# Patient Record
Sex: MALE | NOT HISPANIC OR LATINO | URBAN - METROPOLITAN AREA
[De-identification: names, ages, dates, MRNs, and addresses within clinical notes are randomized per-mention and may not be internally consistent; named-entity substitution may affect disease eponyms.]

---

## 2018-09-16 ENCOUNTER — INPATIENT (INPATIENT)
Facility: HOSPITAL | Age: 53
LOS: 1 days | Discharge: HOME | End: 2018-09-18
Attending: FAMILY MEDICINE | Admitting: FAMILY MEDICINE
Payer: COMMERCIAL

## 2018-09-16 VITALS — RESPIRATION RATE: 18 BRPM | HEIGHT: 71 IN | OXYGEN SATURATION: 98 % | HEART RATE: 110 BPM | WEIGHT: 194.01 LBS

## 2018-09-16 LAB
ALBUMIN SERPL ELPH-MCNC: 3.7 G/DL — SIGNIFICANT CHANGE UP (ref 3.3–5)
ALP SERPL-CCNC: 66 U/L — SIGNIFICANT CHANGE UP (ref 40–120)
ALT FLD-CCNC: 42 U/L — SIGNIFICANT CHANGE UP (ref 12–78)
ANION GAP SERPL CALC-SCNC: 8 MMOL/L — SIGNIFICANT CHANGE UP (ref 5–17)
APPEARANCE UR: CLEAR — SIGNIFICANT CHANGE UP
APTT BLD: 29 SEC — SIGNIFICANT CHANGE UP (ref 27.5–37.4)
AST SERPL-CCNC: 23 U/L — SIGNIFICANT CHANGE UP (ref 15–37)
BACTERIA # UR AUTO: ABNORMAL
BASOPHILS # BLD AUTO: 0.04 K/UL — SIGNIFICANT CHANGE UP (ref 0–0.2)
BASOPHILS NFR BLD AUTO: 0.3 % — SIGNIFICANT CHANGE UP (ref 0–2)
BILIRUB SERPL-MCNC: 0.9 MG/DL — SIGNIFICANT CHANGE UP (ref 0.2–1.2)
BILIRUB UR-MCNC: NEGATIVE — SIGNIFICANT CHANGE UP
BUN SERPL-MCNC: 11 MG/DL — SIGNIFICANT CHANGE UP (ref 7–23)
CALCIUM SERPL-MCNC: 9.2 MG/DL — SIGNIFICANT CHANGE UP (ref 8.5–10.1)
CHLORIDE SERPL-SCNC: 101 MMOL/L — SIGNIFICANT CHANGE UP (ref 96–108)
CO2 SERPL-SCNC: 23 MMOL/L — SIGNIFICANT CHANGE UP (ref 22–31)
COLOR SPEC: YELLOW — SIGNIFICANT CHANGE UP
CREAT SERPL-MCNC: 1.14 MG/DL — SIGNIFICANT CHANGE UP (ref 0.5–1.3)
DIFF PNL FLD: NEGATIVE — SIGNIFICANT CHANGE UP
EOSINOPHIL # BLD AUTO: 0.01 K/UL — SIGNIFICANT CHANGE UP (ref 0–0.5)
EOSINOPHIL NFR BLD AUTO: 0.1 % — SIGNIFICANT CHANGE UP (ref 0–6)
EPI CELLS # UR: SIGNIFICANT CHANGE UP
GLUCOSE SERPL-MCNC: 172 MG/DL — HIGH (ref 70–99)
GLUCOSE UR QL: NEGATIVE MG/DL — SIGNIFICANT CHANGE UP
HCT VFR BLD CALC: 44.5 % — SIGNIFICANT CHANGE UP (ref 39–50)
HGB BLD-MCNC: 14.8 G/DL — SIGNIFICANT CHANGE UP (ref 13–17)
IMM GRANULOCYTES NFR BLD AUTO: 0.7 % — SIGNIFICANT CHANGE UP (ref 0–1.5)
INR BLD: 1.17 RATIO — HIGH (ref 0.88–1.16)
KETONES UR-MCNC: ABNORMAL
LACTATE SERPL-SCNC: 1.3 MMOL/L — SIGNIFICANT CHANGE UP (ref 0.7–2)
LEUKOCYTE ESTERASE UR-ACNC: NEGATIVE — SIGNIFICANT CHANGE UP
LYMPHOCYTES # BLD AUTO: 1.29 K/UL — SIGNIFICANT CHANGE UP (ref 1–3.3)
LYMPHOCYTES # BLD AUTO: 10.1 % — LOW (ref 13–44)
MCHC RBC-ENTMCNC: 27.4 PG — SIGNIFICANT CHANGE UP (ref 27–34)
MCHC RBC-ENTMCNC: 33.3 GM/DL — SIGNIFICANT CHANGE UP (ref 32–36)
MCV RBC AUTO: 82.3 FL — SIGNIFICANT CHANGE UP (ref 80–100)
MONOCYTES # BLD AUTO: 0.83 K/UL — SIGNIFICANT CHANGE UP (ref 0–0.9)
MONOCYTES NFR BLD AUTO: 6.5 % — SIGNIFICANT CHANGE UP (ref 2–14)
NEUTROPHILS # BLD AUTO: 10.56 K/UL — HIGH (ref 1.8–7.4)
NEUTROPHILS NFR BLD AUTO: 82.3 % — HIGH (ref 43–77)
NITRITE UR-MCNC: NEGATIVE — SIGNIFICANT CHANGE UP
NRBC # BLD: 0 /100 WBCS — SIGNIFICANT CHANGE UP (ref 0–0)
PH UR: 6 — SIGNIFICANT CHANGE UP (ref 5–8)
PLATELET # BLD AUTO: 183 K/UL — SIGNIFICANT CHANGE UP (ref 150–400)
POTASSIUM SERPL-MCNC: 3.9 MMOL/L — SIGNIFICANT CHANGE UP (ref 3.5–5.3)
POTASSIUM SERPL-SCNC: 3.9 MMOL/L — SIGNIFICANT CHANGE UP (ref 3.5–5.3)
PROT SERPL-MCNC: 8.9 GM/DL — HIGH (ref 6–8.3)
PROT UR-MCNC: 15 MG/DL
PROTHROM AB SERPL-ACNC: 12.7 SEC — SIGNIFICANT CHANGE UP (ref 9.8–12.7)
RAPID RVP RESULT: SIGNIFICANT CHANGE UP
RBC # BLD: 5.41 M/UL — SIGNIFICANT CHANGE UP (ref 4.2–5.8)
RBC # FLD: 13.4 % — SIGNIFICANT CHANGE UP (ref 10.3–14.5)
RBC CASTS # UR COMP ASSIST: SIGNIFICANT CHANGE UP /HPF (ref 0–4)
SODIUM SERPL-SCNC: 132 MMOL/L — LOW (ref 135–145)
SP GR SPEC: 1.01 — SIGNIFICANT CHANGE UP (ref 1.01–1.02)
TROPONIN I SERPL-MCNC: <0.015 NG/ML — SIGNIFICANT CHANGE UP (ref 0.01–0.04)
UROBILINOGEN FLD QL: NEGATIVE MG/DL — SIGNIFICANT CHANGE UP
WBC # BLD: 12.82 K/UL — HIGH (ref 3.8–10.5)
WBC # FLD AUTO: 12.82 K/UL — HIGH (ref 3.8–10.5)
WBC UR QL: SIGNIFICANT CHANGE UP

## 2018-09-16 PROCEDURE — 71250 CT THORAX DX C-: CPT | Mod: 26

## 2018-09-16 PROCEDURE — 74176 CT ABD & PELVIS W/O CONTRAST: CPT | Mod: 26

## 2018-09-16 PROCEDURE — 71045 X-RAY EXAM CHEST 1 VIEW: CPT | Mod: 26

## 2018-09-16 PROCEDURE — 93010 ELECTROCARDIOGRAM REPORT: CPT

## 2018-09-16 PROCEDURE — 99285 EMERGENCY DEPT VISIT HI MDM: CPT

## 2018-09-16 RX ORDER — IBUPROFEN 200 MG
400 TABLET ORAL EVERY 6 HOURS
Qty: 0 | Refills: 0 | Status: COMPLETED | OUTPATIENT
Start: 2018-09-16 | End: 2018-09-17

## 2018-09-16 RX ORDER — DEXTROSE 50 % IN WATER 50 %
12.5 SYRINGE (ML) INTRAVENOUS ONCE
Qty: 0 | Refills: 0 | Status: DISCONTINUED | OUTPATIENT
Start: 2018-09-16 | End: 2018-09-18

## 2018-09-16 RX ORDER — DEXTROSE 50 % IN WATER 50 %
15 SYRINGE (ML) INTRAVENOUS ONCE
Qty: 0 | Refills: 0 | Status: DISCONTINUED | OUTPATIENT
Start: 2018-09-16 | End: 2018-09-18

## 2018-09-16 RX ORDER — INSULIN LISPRO 100/ML
VIAL (ML) SUBCUTANEOUS
Qty: 0 | Refills: 0 | Status: DISCONTINUED | OUTPATIENT
Start: 2018-09-16 | End: 2018-09-18

## 2018-09-16 RX ORDER — INFLUENZA VIRUS VACCINE 15; 15; 15; 15 UG/.5ML; UG/.5ML; UG/.5ML; UG/.5ML
0.5 SUSPENSION INTRAMUSCULAR ONCE
Qty: 0 | Refills: 0 | Status: COMPLETED | OUTPATIENT
Start: 2018-09-16 | End: 2018-09-16

## 2018-09-16 RX ORDER — AZITHROMYCIN 500 MG/1
500 TABLET, FILM COATED ORAL ONCE
Qty: 0 | Refills: 0 | Status: COMPLETED | OUTPATIENT
Start: 2018-09-16 | End: 2018-09-16

## 2018-09-16 RX ORDER — SODIUM CHLORIDE 9 MG/ML
1000 INJECTION, SOLUTION INTRAVENOUS
Qty: 0 | Refills: 0 | Status: DISCONTINUED | OUTPATIENT
Start: 2018-09-16 | End: 2018-09-18

## 2018-09-16 RX ORDER — SODIUM CHLORIDE 9 MG/ML
1000 INJECTION INTRAMUSCULAR; INTRAVENOUS; SUBCUTANEOUS
Qty: 0 | Refills: 0 | Status: COMPLETED | OUTPATIENT
Start: 2018-09-16 | End: 2018-09-16

## 2018-09-16 RX ORDER — CEFTRIAXONE 500 MG/1
1000 INJECTION, POWDER, FOR SOLUTION INTRAMUSCULAR; INTRAVENOUS ONCE
Qty: 0 | Refills: 0 | Status: COMPLETED | OUTPATIENT
Start: 2018-09-16 | End: 2018-09-16

## 2018-09-16 RX ORDER — IBUPROFEN 200 MG
200 TABLET ORAL ONCE
Qty: 0 | Refills: 0 | Status: COMPLETED | OUTPATIENT
Start: 2018-09-16 | End: 2018-09-16

## 2018-09-16 RX ORDER — DEXTROSE 50 % IN WATER 50 %
25 SYRINGE (ML) INTRAVENOUS ONCE
Qty: 0 | Refills: 0 | Status: DISCONTINUED | OUTPATIENT
Start: 2018-09-16 | End: 2018-09-18

## 2018-09-16 RX ORDER — CEFTRIAXONE 500 MG/1
1000 INJECTION, POWDER, FOR SOLUTION INTRAMUSCULAR; INTRAVENOUS EVERY 24 HOURS
Qty: 0 | Refills: 0 | Status: DISCONTINUED | OUTPATIENT
Start: 2018-09-16 | End: 2018-09-18

## 2018-09-16 RX ORDER — ENOXAPARIN SODIUM 100 MG/ML
40 INJECTION SUBCUTANEOUS EVERY 24 HOURS
Qty: 0 | Refills: 0 | Status: DISCONTINUED | OUTPATIENT
Start: 2018-09-16 | End: 2018-09-18

## 2018-09-16 RX ORDER — GLUCAGON INJECTION, SOLUTION 0.5 MG/.1ML
1 INJECTION, SOLUTION SUBCUTANEOUS ONCE
Qty: 0 | Refills: 0 | Status: DISCONTINUED | OUTPATIENT
Start: 2018-09-16 | End: 2018-09-18

## 2018-09-16 RX ORDER — ACETAMINOPHEN 500 MG
975 TABLET ORAL ONCE
Qty: 0 | Refills: 0 | Status: COMPLETED | OUTPATIENT
Start: 2018-09-16 | End: 2018-09-16

## 2018-09-16 RX ORDER — SODIUM CHLORIDE 9 MG/ML
2500 INJECTION INTRAMUSCULAR; INTRAVENOUS; SUBCUTANEOUS ONCE
Qty: 0 | Refills: 0 | Status: COMPLETED | OUTPATIENT
Start: 2018-09-16 | End: 2018-09-16

## 2018-09-16 RX ORDER — CEFTRIAXONE 500 MG/1
1 INJECTION, POWDER, FOR SOLUTION INTRAMUSCULAR; INTRAVENOUS EVERY 24 HOURS
Qty: 0 | Refills: 0 | Status: DISCONTINUED | OUTPATIENT
Start: 2018-09-16 | End: 2018-09-16

## 2018-09-16 RX ORDER — ACETAMINOPHEN 500 MG
650 TABLET ORAL ONCE
Qty: 0 | Refills: 0 | Status: COMPLETED | OUTPATIENT
Start: 2018-09-16 | End: 2018-09-16

## 2018-09-16 RX ORDER — ACETAMINOPHEN 500 MG
500 TABLET ORAL EVERY 6 HOURS
Qty: 0 | Refills: 0 | Status: DISCONTINUED | OUTPATIENT
Start: 2018-09-16 | End: 2018-09-18

## 2018-09-16 RX ORDER — AZITHROMYCIN 500 MG/1
500 TABLET, FILM COATED ORAL EVERY 24 HOURS
Qty: 0 | Refills: 0 | Status: DISCONTINUED | OUTPATIENT
Start: 2018-09-16 | End: 2018-09-18

## 2018-09-16 RX ADMIN — Medication 500 MILLIGRAM(S): at 16:58

## 2018-09-16 RX ADMIN — AZITHROMYCIN 255 MILLIGRAM(S): 500 TABLET, FILM COATED ORAL at 12:03

## 2018-09-16 RX ADMIN — CEFTRIAXONE 1000 MILLIGRAM(S): 500 INJECTION, POWDER, FOR SOLUTION INTRAMUSCULAR; INTRAVENOUS at 12:03

## 2018-09-16 RX ADMIN — AZITHROMYCIN 500 MILLIGRAM(S): 500 TABLET, FILM COATED ORAL at 13:03

## 2018-09-16 RX ADMIN — Medication 975 MILLIGRAM(S): at 12:03

## 2018-09-16 RX ADMIN — Medication 975 MILLIGRAM(S): at 12:09

## 2018-09-16 RX ADMIN — SODIUM CHLORIDE 500 MILLILITER(S): 9 INJECTION INTRAMUSCULAR; INTRAVENOUS; SUBCUTANEOUS at 19:51

## 2018-09-16 RX ADMIN — Medication 650 MILLIGRAM(S): at 22:47

## 2018-09-16 RX ADMIN — SODIUM CHLORIDE 5000 MILLILITER(S): 9 INJECTION INTRAMUSCULAR; INTRAVENOUS; SUBCUTANEOUS at 11:15

## 2018-09-16 RX ADMIN — SODIUM CHLORIDE 500 MILLILITER(S): 9 INJECTION INTRAMUSCULAR; INTRAVENOUS; SUBCUTANEOUS at 22:11

## 2018-09-16 RX ADMIN — Medication 400 MILLIGRAM(S): at 20:17

## 2018-09-16 RX ADMIN — SODIUM CHLORIDE 2500 MILLILITER(S): 9 INJECTION INTRAMUSCULAR; INTRAVENOUS; SUBCUTANEOUS at 12:15

## 2018-09-16 RX ADMIN — ENOXAPARIN SODIUM 40 MILLIGRAM(S): 100 INJECTION SUBCUTANEOUS at 23:58

## 2018-09-16 NOTE — ED PROVIDER NOTE - OBJECTIVE STATEMENT
Braxton Holguin MD: 53 M w/ Hx of DM on metformin and glipizide, who p/w fever and cough. Constant fever x4 days with Tmax of 104'F. Pt reports left sided flank pain, nonproductive cough and urinary complaints w/ dysuria. Also reports mild SOB and diff catching breath. Pt notes he has been taking Advil and Motrin, which improves the sx but is recurrent after 3-4 hours. Pt went to urgent care today, given flu test and throat swab. Last Advil was 9am this morning. Allergy to Penicillin.

## 2018-09-16 NOTE — ED ADULT TRIAGE NOTE - CHIEF COMPLAINT QUOTE
Pt reports fever over past few days. Reports left flank pain. Pt reports fever over past few days. Reports left flank pain. Pt reports negative flu at urgent care.  Pt reports temp 103 at urgent care.

## 2018-09-16 NOTE — ED ADULT NURSE NOTE - NSFALLRSKPASTHIST_ED_ALL_ED
Problem: Physical Therapy Goal  Goal: Physical Therapy Goal  Goals to be met by: 17     Patient will increase functional independence with mobility by performin. Supine <> sit with Modified [place]  2. Sit to stand transfer with Supervision  3. Bed to chair transfer with Supervision   4. Gait  x 150 feet with Stand-by Assistance  5. Stand for 5 minutes with Supervision   6. Lower extremity exercise program x 10 reps per handout, with supervision    Outcome: Ongoing (interventions implemented as appropriate)  PT evaluation completed and pt will benefit from skilled PT services to address pt's functional limitations.       no

## 2018-09-16 NOTE — H&P ADULT - ASSESSMENT
A/P    #sepsis due to right lung pneumonia- cAP- ct abx, culture, iv fluids, supportive care     #Hyponatremia- monitor it, iv fluids     #DM- ISSS     #DVT pr     #Above discussed with pt and all questions have been answered

## 2018-09-16 NOTE — ED PROVIDER NOTE - CARE PLAN
Principal Discharge DX:	Sepsis  Secondary Diagnosis:	Pneumonia Principal Discharge DX:	Sepsis  Secondary Diagnosis:	Pneumonia  Secondary Diagnosis:	Dyspnea and respiratory abnormalities

## 2018-09-16 NOTE — H&P ADULT - HISTORY OF PRESENT ILLNESS
54 y/o male started having sudden onset fever about 4 days ago, intermittent, coming every 4-6 hours, relieved by taking Advil at home, associated with chills, rigor, gradually getting weak, not able to drink much water, eat food.     -recent travel to [facility]    FH- no simial complaints in family, h/o dm in family present

## 2018-09-16 NOTE — ED STATDOCS - PROGRESS NOTE DETAILS
Char Short for Dr. Solomon: 52 y/o male with no pertinent PMHx presents to the ED c/o constant fever x4 days. Pt reports left sided flank pain, cough and urinary complaints. Pt notes he has been taking Advil and Motrin, which improves the sx but is recurrent after 3-4 hours. Pt went to urgent care today, given flu test and throat swab. Last Advil was 9am this morning. Allergy to Penicillin. Will send pt to main ED for further evaluation. Char Short for Dr. Solomon: 54 y/o male with no pertinent PMHx presents to the ED c/o constant fever x4 days. Pt reports left sided flank pain, cough and urinary complaints. Pt notes he has been taking Advil which improves the sx but is recurrent after 3-4 hours. Pt went to urgent care today, given flu test and throat swab. Last Advil was 9am this morning. Allergy to Penicillin. Will send pt to main ED for further evaluation.

## 2018-09-16 NOTE — ED ADULT NURSE NOTE - CHIEF COMPLAINT QUOTE
Pt reports fever over past few days. Reports left flank pain. Pt reports negative flu at urgent care.  Pt reports temp 103 at urgent care.

## 2018-09-16 NOTE — ED PROVIDER NOTE - PHYSICAL EXAMINATION
Physical Exam:   GEN: adult M who is in no acute distress, AAOx3  HENT: NCAT, MMM, no stridor  EYES: PERRLA, EOMI  RESP: + R sided rales and ronchi, + minimal respiratory distress  CV: + tachycardia and regular rhythm, S1 S2  ABD: soft and NTND  EXT: No edema, No bony deformity of extremities  SKIN: No skin breaks, skin color normal for race  NEURO: CN grossly intact, No focal motor or sensory deficits.   ~ Braxton Holguin MD Physical Exam:   GEN: adult M who is in no acute distress, AAOx3  HENT: NCAT, MMM, no stridor  EYES: PERRLA, EOMI  RESP: + R sided rales and ronchi, + minimal respiratory distress  CV: + tachycardia and regular rhythm, S1 S2  ABD: soft and NTND  : negative for CVA tenderness  EXT: No edema, No bony deformity of extremities  SKIN: No skin breaks, skin color normal for race  NEURO: CN grossly intact, No focal motor or sensory deficits.   ~ Braxton Holguin MD

## 2018-09-16 NOTE — H&P ADULT - NSHPLABSRESULTS_GEN_ALL_CORE
14.8   12.82 )-----------( 183      ( 16 Sep 2018 11:48 )             44.5     -16    132<L>  |  101  |  11  ----------------------------<  172<H>  3.9   |  23  |  1.14    Ca    9.2      16 Sep 2018 11:48    TPro  8.9<H>  /  Alb  3.7  /  TBili  0.9  /  DBili  x   /  AST  23  /  ALT  42  /  AlkPhos  66  09-16    LIVER FUNCTIONS - ( 16 Sep 2018 11:48 )  Alb: 3.7 g/dL / Pro: 8.9 gm/dL / ALK PHOS: 66 U/L / ALT: 42 U/L / AST: 23 U/L / GGT: x             PT/INR - ( 16 Sep 2018 11:48 )   PT: 12.7 sec;   INR: 1.17 ratio         PTT - ( 16 Sep 2018 11:48 )  PTT:29.0 sec  Urinalysis Basic - ( 16 Sep 2018 15:13 )    Color: Yellow / Appearance: Clear / S.010 / pH: x  Gluc: x / Ketone: Small  / Bili: Negative / Urobili: Negative mg/dL   Blood: x / Protein: 15 mg/dL / Nitrite: Negative   Leuk Esterase: Negative / RBC: 0-2 /HPF / WBC 0-2   Sq Epi: x / Non Sq Epi: Few / Bacteria: Few        PT/INR - ( 16 Sep 2018 11:48 )   PT: 12.7 sec;   INR: 1.17 ratio         PTT - ( 16 Sep 2018 11:48 )  PTT:29.0 sec  CAPILLARY BLOOD GLUCOSE    < from: CT Chest No Cont (18 @ 15:38) >    Right lower lobe pneumonia with trace right pleural effusion    < end of copied text >          MEDICATIONS  (STANDING):  azithromycin  IVPB 500 milliGRAM(s) IV Intermittent every 24 hours  cefTRIAXone   IVPB 1 Gram(s) IV Intermittent every 24 hours  enoxaparin Injectable 40 milliGRAM(s) SubCutaneous every 24 hours  lactated ringers. 1000 milliLiter(s) (100 mL/Hr) IV Continuous <Continuous>    MEDICATIONS  (PRN):  acetaminophen   Tablet .. 500 milliGRAM(s) Oral every 6 hours PRN Temp greater or equal to 38C (100.4F)

## 2018-09-16 NOTE — H&P ADULT - NSHPPHYSICALEXAM_GEN_ALL_CORE
Vital Signs Last 24 Hrs  T(C): 37.9 (16 Sep 2018 11:32), Max: 37.9 (16 Sep 2018 11:32)  T(F): 100.3 (16 Sep 2018 11:32), Max: 100.3 (16 Sep 2018 11:32)  HR: 107 (16 Sep 2018 11:11) (107 - 110)  BP: 121/82 (16 Sep 2018 11:11) (121/82 - 121/82)  BP(mean): --  RR: 18 (16 Sep 2018 11:11) (18 - 18)  SpO2: 96% (16 Sep 2018 11:11) (96% - 98%)    General: toxic appearance   Head- Atraumatic, normocephalic   Neurology: A&Ox3, nonfocal, CN II to XII intact, power intact 5/5 in all muscle group  HEENT- PERRLA, dry muscous membrane, congested mucosa present  Neck-supple, no JVD  Respiratory: Air entry equal b/l,  crackles at right base  CVS:  S1S2, no murmurs, rubs or gallops  Abdominal: Soft, NT, ND +BS,   Genitourinary- voiding, non palpable bladder  Extremities: No edema, + peripheral pulses  Skin- no rash, no ulcer  Psychiatric- mood stable   LN- no lymphadenopathy

## 2018-09-16 NOTE — ED PROVIDER NOTE - PROGRESS NOTE DETAILS
Braxton Holguin MD: endorsed to Dr. Mckay Braxton Holguin MD: pt w/ improved tachypnea, stable for medicine admission at this time. pt admitted for sepsis criteria and dyspnea at rest. endorsed to Dr. Mckay

## 2018-09-16 NOTE — ED PROVIDER NOTE - MEDICAL DECISION MAKING DETAILS
Braxton Holguin MD: fever w/ cough and SOB, also w/ L flank pain and urinary difficulties. concerning for PNA w/ possible UTI. no CVA tenderness, not likely associated w/ stone. labs, U/A, CXR. likely admit as pt meeting sepsis criteria and w/ Hx of DM, and also w/ tachypnea at rest.

## 2018-09-16 NOTE — ED PROVIDER NOTE - NS ED ROS FT
CONST: + fevers, + chills  EYES: no redness, no vision changes   HENT: no hearing changes, no epistaxis  CV: no chest pain, no palpitations     RESP: + shortness of breath, + cough  ABD: no abdominal pain, no vomiting     : no dysuria, no hematuria   MSK: no muscle pain, no joint swelling     NEURO: no headache, no focal weakness or loss of sensation     SKIN:  no rash, no lacerations.   ~ Braxton Holguin MD CONST: + fevers, + chills  EYES: no redness, no vision changes   HENT: no hearing changes, no epistaxis  CV: no chest pain, no palpitations     RESP: + shortness of breath, + cough  ABD: no abdominal pain, no vomiting     : + flank pain, + dysuria, no hematuria   MSK: no muscle pain, no joint swelling     NEURO: no headache, no focal weakness or loss of sensation     SKIN:  no rash, no lacerations.   ~ Braxton Holguin MD

## 2018-09-17 LAB
ANION GAP SERPL CALC-SCNC: 9 MMOL/L — SIGNIFICANT CHANGE UP (ref 5–17)
BASOPHILS # BLD AUTO: 0.02 K/UL — SIGNIFICANT CHANGE UP (ref 0–0.2)
BASOPHILS NFR BLD AUTO: 0.2 % — SIGNIFICANT CHANGE UP (ref 0–2)
BUN SERPL-MCNC: 8 MG/DL — SIGNIFICANT CHANGE UP (ref 7–23)
CALCIUM SERPL-MCNC: 7.8 MG/DL — LOW (ref 8.5–10.1)
CHLORIDE SERPL-SCNC: 112 MMOL/L — HIGH (ref 96–108)
CO2 SERPL-SCNC: 21 MMOL/L — LOW (ref 22–31)
CREAT SERPL-MCNC: 0.7 MG/DL — SIGNIFICANT CHANGE UP (ref 0.5–1.3)
CULTURE RESULTS: NO GROWTH — SIGNIFICANT CHANGE UP
EOSINOPHIL # BLD AUTO: 0.03 K/UL — SIGNIFICANT CHANGE UP (ref 0–0.5)
EOSINOPHIL NFR BLD AUTO: 0.3 % — SIGNIFICANT CHANGE UP (ref 0–6)
GLUCOSE BLDC GLUCOMTR-MCNC: 133 MG/DL — HIGH (ref 70–99)
GLUCOSE BLDC GLUCOMTR-MCNC: 150 MG/DL — HIGH (ref 70–99)
GLUCOSE BLDC GLUCOMTR-MCNC: 153 MG/DL — HIGH (ref 70–99)
GLUCOSE BLDC GLUCOMTR-MCNC: 164 MG/DL — HIGH (ref 70–99)
GLUCOSE SERPL-MCNC: 157 MG/DL — HIGH (ref 70–99)
HBA1C BLD-MCNC: 7.1 % — HIGH (ref 4–5.6)
HCT VFR BLD CALC: 36.7 % — LOW (ref 39–50)
HGB BLD-MCNC: 12.1 G/DL — LOW (ref 13–17)
IMM GRANULOCYTES NFR BLD AUTO: 0.5 % — SIGNIFICANT CHANGE UP (ref 0–1.5)
LYMPHOCYTES # BLD AUTO: 1.13 K/UL — SIGNIFICANT CHANGE UP (ref 1–3.3)
LYMPHOCYTES # BLD AUTO: 11.1 % — LOW (ref 13–44)
MCHC RBC-ENTMCNC: 26.9 PG — LOW (ref 27–34)
MCHC RBC-ENTMCNC: 33 GM/DL — SIGNIFICANT CHANGE UP (ref 32–36)
MCV RBC AUTO: 81.7 FL — SIGNIFICANT CHANGE UP (ref 80–100)
MONOCYTES # BLD AUTO: 0.69 K/UL — SIGNIFICANT CHANGE UP (ref 0–0.9)
MONOCYTES NFR BLD AUTO: 6.8 % — SIGNIFICANT CHANGE UP (ref 2–14)
NEUTROPHILS # BLD AUTO: 8.27 K/UL — HIGH (ref 1.8–7.4)
NEUTROPHILS NFR BLD AUTO: 81.1 % — HIGH (ref 43–77)
NRBC # BLD: 0 /100 WBCS — SIGNIFICANT CHANGE UP (ref 0–0)
PLATELET # BLD AUTO: 159 K/UL — SIGNIFICANT CHANGE UP (ref 150–400)
POTASSIUM SERPL-MCNC: 3.8 MMOL/L — SIGNIFICANT CHANGE UP (ref 3.5–5.3)
POTASSIUM SERPL-SCNC: 3.8 MMOL/L — SIGNIFICANT CHANGE UP (ref 3.5–5.3)
RBC # BLD: 4.49 M/UL — SIGNIFICANT CHANGE UP (ref 4.2–5.8)
RBC # FLD: 13.5 % — SIGNIFICANT CHANGE UP (ref 10.3–14.5)
SODIUM SERPL-SCNC: 142 MMOL/L — SIGNIFICANT CHANGE UP (ref 135–145)
SPECIMEN SOURCE: SIGNIFICANT CHANGE UP
WBC # BLD: 10.19 K/UL — SIGNIFICANT CHANGE UP (ref 3.8–10.5)
WBC # FLD AUTO: 10.19 K/UL — SIGNIFICANT CHANGE UP (ref 3.8–10.5)

## 2018-09-17 PROCEDURE — 93010 ELECTROCARDIOGRAM REPORT: CPT

## 2018-09-17 RX ADMIN — AZITHROMYCIN 255 MILLIGRAM(S): 500 TABLET, FILM COATED ORAL at 11:51

## 2018-09-17 RX ADMIN — Medication 400 MILLIGRAM(S): at 15:12

## 2018-09-17 RX ADMIN — ENOXAPARIN SODIUM 40 MILLIGRAM(S): 100 INJECTION SUBCUTANEOUS at 21:46

## 2018-09-17 RX ADMIN — Medication 400 MILLIGRAM(S): at 04:55

## 2018-09-17 RX ADMIN — Medication 1: at 11:50

## 2018-09-17 RX ADMIN — Medication 500 MILLIGRAM(S): at 21:47

## 2018-09-17 RX ADMIN — CEFTRIAXONE 1000 MILLIGRAM(S): 500 INJECTION, POWDER, FOR SOLUTION INTRAMUSCULAR; INTRAVENOUS at 11:51

## 2018-09-17 RX ADMIN — Medication 1: at 08:28

## 2018-09-17 RX ADMIN — SODIUM CHLORIDE 100 MILLILITER(S): 9 INJECTION, SOLUTION INTRAVENOUS at 00:32

## 2018-09-17 RX ADMIN — Medication 500 MILLIGRAM(S): at 21:46

## 2018-09-17 NOTE — PROGRESS NOTE ADULT - SUBJECTIVE AND OBJECTIVE BOX
HOSPITAL COURSE AND ASSESSMENT  52 y/o male started having sudden onset fever about 4 days ago, intermittent, coming every 4-6 hours, relieved by taking Advil at home, associated with chills, rigor, gradually getting weak, not able to drink much water, eat food.     Pt was admitted to medicine. He was treated for CAP. He remained febrile for 48 hours.    Anticipate discharge this PM/tomorrow AM with improved fever curve.      #sepsis (leukocytosis, fever) due to CAP-still febrile  -Source evaluation with blood culture pending, RVP negative  -CT with right lung pneumonia  -Lactate 1.3  -CTX + azithromycin day 2  -pneumonia severity index risk class II (low risk)    #Hypovolemic hyponatremia- resolved      #DMtype II without complication  -A1C 7.1        ----------------------------------------------------------------------------------------------  CHIEF COMPLAINT:  CAP    SUBJECTIVE:     tolerating PO. OOB. feels much better      REVIEW OF SYSTEMS:  All other review of systems is negative unless indicated above    Vital Signs Last 24 Hrs  T(C): 38.5 (17 Sep 2018 14:40), Max: 39.2 (16 Sep 2018 16:55)  T(F): 101.3 (17 Sep 2018 14:40), Max: 102.6 (16 Sep 2018 18:36)  HR: 81 (17 Sep 2018 11:20) (81 - 112)  BP: 108/73 (17 Sep 2018 11:20) (99/68 - 136/94)  BP(mean): --  RR: 16 (17 Sep 2018 11:20) (16 - 20)  SpO2: 96% (17 Sep 2018 11:20) (95% - 97%)  CAPILLARY BLOOD GLUCOSE      POCT Blood Glucose.: 164 mg/dL (17 Sep 2018 11:30)  POCT Blood Glucose.: 153 mg/dL (17 Sep 2018 07:53)      PHYSICAL EXAM:  Constitutional: NAD, NCAT  HEENT: EOMI, Normal Hearing, MMM, fair dentition  Neck: trachea midline, No JVD  Respiratory: Breath sounds are clear, unlabored respiration  Cardiovascular: S1 and S2, regular rate   Gastrointestinal: Bowel Sounds present, soft, nontender, nondistended  Extremities: No peripheral edema  Vascular: 2+ radial pulse  Neurological: awake, alert, follows commands, sensation grossly intact  Psych: appropriate affect,  insight  Musculoskeletal: moves all extremities  Skin: No rashes    ALLERGIES  penicillin (Unknown)      MEDICATIONS  (STANDING):  azithromycin  IVPB 500 milliGRAM(s) IV Intermittent every 24 hours  cefTRIAXone Injectable. 1000 milliGRAM(s) IV Push every 24 hours  dextrose 5%. 1000 milliLiter(s) (50 mL/Hr) IV Continuous <Continuous>  dextrose 50% Injectable 12.5 Gram(s) IV Push once  dextrose 50% Injectable 25 Gram(s) IV Push once  dextrose 50% Injectable 25 Gram(s) IV Push once  enoxaparin Injectable 40 milliGRAM(s) SubCutaneous every 24 hours  influenza   Vaccine 0.5 milliLiter(s) IntraMuscular once  insulin lispro (HumaLOG) corrective regimen sliding scale   SubCutaneous three times a day before meals  lactated ringers. 1000 milliLiter(s) (100 mL/Hr) IV Continuous <Continuous>      LABS: All Labs Reviewed:                        12.1   10.19 )-----------( 159      ( 17 Sep 2018 06:59 )             36.7     09-17    142  |  112<H>  |  8   ----------------------------<  157<H>  3.8   |  21<L>  |  0.70    Ca    7.8<L>      17 Sep 2018 06:59    TPro  8.9<H>  /  Alb  3.7  /  TBili  0.9  /  DBili  x   /  AST  23  /  ALT  42  /  AlkPhos  66  09-16    PT/INR - ( 16 Sep 2018 11:48 )   PT: 12.7 sec;   INR: 1.17 ratio         PTT - ( 16 Sep 2018 11:48 )  PTT:29.0 sec  CARDIAC MARKERS ( 16 Sep 2018 11:48 )  <0.015 ng/mL / x     / x     / x     / x          Blood Culture:

## 2018-09-18 VITALS
RESPIRATION RATE: 16 BRPM | DIASTOLIC BLOOD PRESSURE: 72 MMHG | SYSTOLIC BLOOD PRESSURE: 123 MMHG | TEMPERATURE: 101 F | HEART RATE: 92 BPM | OXYGEN SATURATION: 99 %

## 2018-09-18 LAB
GLUCOSE BLDC GLUCOMTR-MCNC: 153 MG/DL — HIGH (ref 70–99)
GRAM STN FLD: SIGNIFICANT CHANGE UP
LEGIONELLA AG UR QL: NEGATIVE — SIGNIFICANT CHANGE UP
SPECIMEN SOURCE: SIGNIFICANT CHANGE UP

## 2018-09-18 RX ORDER — CEFUROXIME AXETIL 250 MG
1 TABLET ORAL
Qty: 10 | Refills: 0 | OUTPATIENT
Start: 2018-09-18 | End: 2018-09-22

## 2018-09-18 RX ORDER — AZITHROMYCIN 500 MG/1
1 TABLET, FILM COATED ORAL
Qty: 3 | Refills: 0
Start: 2018-09-18 | End: 2018-09-20

## 2018-09-18 RX ORDER — ACETAMINOPHEN 500 MG
1 TABLET ORAL
Qty: 0 | Refills: 0 | DISCHARGE
Start: 2018-09-18

## 2018-09-18 RX ORDER — AZITHROMYCIN 500 MG/1
1 TABLET, FILM COATED ORAL
Qty: 3 | Refills: 0 | OUTPATIENT
Start: 2018-09-18 | End: 2018-09-20

## 2018-09-18 RX ORDER — CEFUROXIME AXETIL 250 MG
1 TABLET ORAL
Qty: 10 | Refills: 0
Start: 2018-09-18 | End: 2018-09-22

## 2018-09-18 RX ADMIN — Medication 500 MILLIGRAM(S): at 11:22

## 2018-09-18 RX ADMIN — SODIUM CHLORIDE 100 MILLILITER(S): 9 INJECTION, SOLUTION INTRAVENOUS at 05:22

## 2018-09-18 RX ADMIN — Medication 1: at 08:20

## 2018-09-18 NOTE — DISCHARGE NOTE ADULT - PATIENT PORTAL LINK FT
You can access the [facility] Patient Portal, offered by [facility], by registering with the following website: [email or web address]

## 2018-09-18 NOTE — DISCHARGE NOTE ADULT - HOSPITAL COURSE
54 y/o male started having sudden onset fever about 4 days ago, intermittent, coming every 4-6 hours, relieved by taking Advil at home, associated with chills, rigor, gradually getting weak, not able to drink much water, eat food.     Pt was admitted to medicine. He was treated for CAP. He remained febrile for 48 hours. Medically stable for discharge home and patient in agreement with the plan.        #sepsis (leukocytosis, fever) due to CAP  -Source evaluation with blood culture no growth, RVP negative, sputum culture prelim respiratory geri  -CT with right lung pneumonia  -Lactate 1.3  -CTX + azithromycin day 3- to change to ceftin/azithro on discharge  -pneumonia severity index risk class II (low risk)    #Hypovolemic hyponatremia- resolved      #DMtype II without complication  -A1C 7.1    total time, including coordination of care: 31 minutes  GEN: NAD  EYES: eomi  CV: no edema  RESP: unlabored

## 2018-09-18 NOTE — DISCHARGE NOTE ADULT - PLAN OF CARE
to be free of infection take all medications as directed.    PCP- follow up in 1 week. Bring these papers with you to that appointment so your PCP can request records from your hospital stay.

## 2018-09-18 NOTE — DISCHARGE NOTE ADULT - CARE PLAN
Principal Discharge DX:	Pneumonia  Goal:	to be free of infection  Assessment and plan of treatment:	take all medications as directed.    PCP- follow up in 1 week. Bring these papers with you to that appointment so your PCP can request records from your hospital stay.

## 2018-09-18 NOTE — DISCHARGE NOTE ADULT - MEDICATION SUMMARY - MEDICATIONS TO TAKE
I will START or STAY ON the medications listed below when I get home from the hospital:    acetaminophen 500 mg oral tablet  -- 1 tab(s) by mouth every 6 hours, As needed, Temp greater or equal to 38C (100.4F)  -- Indication: For tylenol    cefuroxime 500 mg oral tablet  -- 1 tab(s) by mouth every 12 hours   -- Finish all this medication unless otherwise directed by prescriber.  Medication should be taken with plenty of water.  Take with food or milk.    -- Indication: For antibiotic    Zithromax 500 mg oral tablet  -- 1 tab(s) by mouth once a day   -- Do not take dairy products, antacids, or iron preparations within one hour of this medication.  Finish all this medication unless otherwise directed by prescriber.    -- Indication: For antibiotic

## 2018-09-19 LAB
CULTURE RESULTS: SIGNIFICANT CHANGE UP
LEGIONELLA DNA SPEC NAA+PROBE: POSITIVE
SPECIMEN SOURCE: SIGNIFICANT CHANGE UP
SPECIMEN SOURCE: SIGNIFICANT CHANGE UP

## 2018-09-21 LAB
CULTURE RESULTS: SIGNIFICANT CHANGE UP
CULTURE RESULTS: SIGNIFICANT CHANGE UP
SPECIMEN SOURCE: SIGNIFICANT CHANGE UP
SPECIMEN SOURCE: SIGNIFICANT CHANGE UP

## 2018-09-27 DIAGNOSIS — E11.9 TYPE 2 DIABETES MELLITUS WITHOUT COMPLICATIONS: ICD-10-CM

## 2018-09-27 DIAGNOSIS — A41.9 SEPSIS, UNSPECIFIED ORGANISM: ICD-10-CM

## 2018-09-27 DIAGNOSIS — Z79.84 LONG TERM (CURRENT) USE OF ORAL HYPOGLYCEMIC DRUGS: ICD-10-CM

## 2018-09-27 DIAGNOSIS — E87.1 HYPO-OSMOLALITY AND HYPONATREMIA: ICD-10-CM

## 2018-09-27 DIAGNOSIS — J18.9 PNEUMONIA, UNSPECIFIED ORGANISM: ICD-10-CM

## 2018-09-27 DIAGNOSIS — E86.1 HYPOVOLEMIA: ICD-10-CM

## 2021-01-20 NOTE — PATIENT PROFILE ADULT. - DOES PATIENT HAVE ADVANCE DIRECTIVE
No [General Appearance - Well Developed] : well developed [General Appearance - Well Nourished] : well nourished [Normal Conjunctiva] : the conjunctiva exhibited no abnormalities [Normal Oral Mucosa] : normal oral mucosa [Normal Jugular Venous V Waves Present] : normal jugular venous V waves present [Auscultation Breath Sounds / Voice Sounds] : lungs were clear to auscultation bilaterally [Bowel Sounds] : normal bowel sounds [Abdomen Soft] : soft [Abnormal Walk] : normal gait [Nail Clubbing] : no clubbing of the fingernails [Cyanosis, Localized] : no localized cyanosis [Skin Color & Pigmentation] : normal skin color and pigmentation [] : no rash [Oriented To Time, Place, And Person] : oriented to person, place, and time [No Anxiety] : not feeling anxious [5th Left ICS - MCL] : palpated at the 5th LICS in the midclavicular line [Normal] : normal [Normal Rate] : normal [Rhythm Regular] : regular [Normal S1] : normal S1 [Normal S2] : normal S2 [S4] : an S4 was heard [II] : a grade 2 [Right Carotid Bruit] : right carotid bruit heard [Left Carotid Bruit] : left carotid bruit heard [2+] : left 2+ [No Pitting Edema] : no pitting edema present [Rt] : no varicose veins of the right leg [Lt] : no varicose veins of the left leg
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
## 2023-03-07 ENCOUNTER — INPATIENT (INPATIENT)
Facility: HOSPITAL | Age: 58
LOS: 2 days | Discharge: HOME HEALTH | DRG: 871 | End: 2023-03-10
Attending: INTERNAL MEDICINE | Admitting: FAMILY MEDICINE
Payer: COMMERCIAL

## 2023-03-07 VITALS — HEIGHT: 71 IN | WEIGHT: 199.96 LBS

## 2023-03-07 DIAGNOSIS — A41.9 SEPSIS, UNSPECIFIED ORGANISM: ICD-10-CM

## 2023-03-07 PROBLEM — E11.9 TYPE 2 DIABETES MELLITUS WITHOUT COMPLICATIONS: Chronic | Status: ACTIVE | Noted: 2018-09-16

## 2023-03-07 LAB
ALBUMIN SERPL ELPH-MCNC: 3.5 G/DL — SIGNIFICANT CHANGE UP (ref 3.3–5)
ALP SERPL-CCNC: 98 U/L — SIGNIFICANT CHANGE UP (ref 40–120)
ALT FLD-CCNC: 42 U/L — SIGNIFICANT CHANGE UP (ref 12–78)
ANION GAP SERPL CALC-SCNC: 8 MMOL/L — SIGNIFICANT CHANGE UP (ref 5–17)
APPEARANCE UR: CLEAR — SIGNIFICANT CHANGE UP
APTT BLD: 23.4 SEC — LOW (ref 27.5–35.5)
AST SERPL-CCNC: 29 U/L — SIGNIFICANT CHANGE UP (ref 15–37)
BACTERIA # UR AUTO: ABNORMAL
BASOPHILS # BLD AUTO: 0 K/UL — SIGNIFICANT CHANGE UP (ref 0–0.2)
BASOPHILS NFR BLD AUTO: 0 % — SIGNIFICANT CHANGE UP (ref 0–2)
BILIRUB SERPL-MCNC: 0.8 MG/DL — SIGNIFICANT CHANGE UP (ref 0.2–1.2)
BILIRUB UR-MCNC: ABNORMAL
BUN SERPL-MCNC: 15 MG/DL — SIGNIFICANT CHANGE UP (ref 7–23)
CALCIUM SERPL-MCNC: 9.2 MG/DL — SIGNIFICANT CHANGE UP (ref 8.5–10.1)
CHLORIDE SERPL-SCNC: 107 MMOL/L — SIGNIFICANT CHANGE UP (ref 96–108)
CO2 SERPL-SCNC: 18 MMOL/L — LOW (ref 22–31)
COLOR SPEC: YELLOW — SIGNIFICANT CHANGE UP
CREAT SERPL-MCNC: 0.97 MG/DL — SIGNIFICANT CHANGE UP (ref 0.5–1.3)
DIFF PNL FLD: ABNORMAL
EGFR: 90 ML/MIN/1.73M2 — SIGNIFICANT CHANGE UP
EOSINOPHIL # BLD AUTO: 0.02 K/UL — SIGNIFICANT CHANGE UP (ref 0–0.5)
EOSINOPHIL NFR BLD AUTO: 0.5 % — SIGNIFICANT CHANGE UP (ref 0–6)
EPI CELLS # UR: SIGNIFICANT CHANGE UP
GLUCOSE BLDC GLUCOMTR-MCNC: 195 MG/DL — HIGH (ref 70–99)
GLUCOSE BLDC GLUCOMTR-MCNC: 196 MG/DL — HIGH (ref 70–99)
GLUCOSE SERPL-MCNC: 253 MG/DL — HIGH (ref 70–99)
GLUCOSE UR QL: 250
HCT VFR BLD CALC: 41.5 % — SIGNIFICANT CHANGE UP (ref 39–50)
HGB BLD-MCNC: 13.7 G/DL — SIGNIFICANT CHANGE UP (ref 13–17)
IMM GRANULOCYTES NFR BLD AUTO: 0.5 % — SIGNIFICANT CHANGE UP (ref 0–0.9)
INR BLD: 1.2 RATIO — HIGH (ref 0.88–1.16)
KETONES UR-MCNC: ABNORMAL
LACTATE SERPL-SCNC: 2.2 MMOL/L — HIGH (ref 0.7–2)
LACTATE SERPL-SCNC: 3 MMOL/L — HIGH (ref 0.7–2)
LACTATE SERPL-SCNC: 4.1 MMOL/L — CRITICAL HIGH (ref 0.7–2)
LEUKOCYTE ESTERASE UR-ACNC: ABNORMAL
LYMPHOCYTES # BLD AUTO: 0.87 K/UL — LOW (ref 1–3.3)
LYMPHOCYTES # BLD AUTO: 23.5 % — SIGNIFICANT CHANGE UP (ref 13–44)
MCHC RBC-ENTMCNC: 26.9 PG — LOW (ref 27–34)
MCHC RBC-ENTMCNC: 33 GM/DL — SIGNIFICANT CHANGE UP (ref 32–36)
MCV RBC AUTO: 81.5 FL — SIGNIFICANT CHANGE UP (ref 80–100)
MONOCYTES # BLD AUTO: 0.06 K/UL — SIGNIFICANT CHANGE UP (ref 0–0.9)
MONOCYTES NFR BLD AUTO: 1.6 % — LOW (ref 2–14)
NEUTROPHILS # BLD AUTO: 2.74 K/UL — SIGNIFICANT CHANGE UP (ref 1.8–7.4)
NEUTROPHILS NFR BLD AUTO: 73.9 % — SIGNIFICANT CHANGE UP (ref 43–77)
NITRITE UR-MCNC: POSITIVE
PH UR: 5 — SIGNIFICANT CHANGE UP (ref 5–8)
PLATELET # BLD AUTO: 154 K/UL — SIGNIFICANT CHANGE UP (ref 150–400)
POTASSIUM SERPL-MCNC: 3.3 MMOL/L — LOW (ref 3.5–5.3)
POTASSIUM SERPL-SCNC: 3.3 MMOL/L — LOW (ref 3.5–5.3)
PROT SERPL-MCNC: 8.3 GM/DL — SIGNIFICANT CHANGE UP (ref 6–8.3)
PROT UR-MCNC: 100
PROTHROM AB SERPL-ACNC: 13.9 SEC — HIGH (ref 10.5–13.4)
RAPID RVP RESULT: SIGNIFICANT CHANGE UP
RBC # BLD: 5.09 M/UL — SIGNIFICANT CHANGE UP (ref 4.2–5.8)
RBC # FLD: 13.3 % — SIGNIFICANT CHANGE UP (ref 10.3–14.5)
RBC CASTS # UR COMP ASSIST: ABNORMAL /HPF (ref 0–4)
SARS-COV-2 RNA SPEC QL NAA+PROBE: SIGNIFICANT CHANGE UP
SODIUM SERPL-SCNC: 133 MMOL/L — LOW (ref 135–145)
SP GR SPEC: 1.02 — SIGNIFICANT CHANGE UP (ref 1.01–1.02)
TROPONIN I, HIGH SENSITIVITY RESULT: 47.21 NG/L — SIGNIFICANT CHANGE UP
UROBILINOGEN FLD QL: 1
WBC # BLD: 3.71 K/UL — LOW (ref 3.8–10.5)
WBC # FLD AUTO: 3.71 K/UL — LOW (ref 3.8–10.5)
WBC UR QL: ABNORMAL /HPF (ref 0–5)

## 2023-03-07 PROCEDURE — 83036 HEMOGLOBIN GLYCOSYLATED A1C: CPT

## 2023-03-07 PROCEDURE — 87040 BLOOD CULTURE FOR BACTERIA: CPT

## 2023-03-07 PROCEDURE — 85027 COMPLETE CBC AUTOMATED: CPT

## 2023-03-07 PROCEDURE — 99285 EMERGENCY DEPT VISIT HI MDM: CPT

## 2023-03-07 PROCEDURE — 83605 ASSAY OF LACTIC ACID: CPT

## 2023-03-07 PROCEDURE — 36415 COLL VENOUS BLD VENIPUNCTURE: CPT

## 2023-03-07 PROCEDURE — 93010 ELECTROCARDIOGRAM REPORT: CPT | Mod: 76

## 2023-03-07 PROCEDURE — 82962 GLUCOSE BLOOD TEST: CPT

## 2023-03-07 PROCEDURE — C1751: CPT

## 2023-03-07 PROCEDURE — 99223 1ST HOSP IP/OBS HIGH 75: CPT

## 2023-03-07 PROCEDURE — 80048 BASIC METABOLIC PNL TOTAL CA: CPT

## 2023-03-07 PROCEDURE — 86803 HEPATITIS C AB TEST: CPT

## 2023-03-07 PROCEDURE — 71045 X-RAY EXAM CHEST 1 VIEW: CPT | Mod: 26

## 2023-03-07 RX ORDER — ROSUVASTATIN CALCIUM 5 MG/1
1 TABLET ORAL
Qty: 0 | Refills: 0 | DISCHARGE

## 2023-03-07 RX ORDER — ACETAMINOPHEN 500 MG
650 TABLET ORAL ONCE
Refills: 0 | Status: COMPLETED | OUTPATIENT
Start: 2023-03-07 | End: 2023-03-07

## 2023-03-07 RX ORDER — GLUCAGON INJECTION, SOLUTION 0.5 MG/.1ML
1 INJECTION, SOLUTION SUBCUTANEOUS ONCE
Refills: 0 | Status: DISCONTINUED | OUTPATIENT
Start: 2023-03-07 | End: 2023-03-10

## 2023-03-07 RX ORDER — INSULIN LISPRO 100/ML
VIAL (ML) SUBCUTANEOUS
Refills: 0 | Status: DISCONTINUED | OUTPATIENT
Start: 2023-03-07 | End: 2023-03-10

## 2023-03-07 RX ORDER — CLOPIDOGREL BISULFATE 75 MG/1
75 TABLET, FILM COATED ORAL DAILY
Refills: 0 | Status: DISCONTINUED | OUTPATIENT
Start: 2023-03-07 | End: 2023-03-10

## 2023-03-07 RX ORDER — CLOPIDOGREL BISULFATE 75 MG/1
1 TABLET, FILM COATED ORAL
Qty: 0 | Refills: 0 | DISCHARGE

## 2023-03-07 RX ORDER — DEXTROSE 50 % IN WATER 50 %
15 SYRINGE (ML) INTRAVENOUS ONCE
Refills: 0 | Status: DISCONTINUED | OUTPATIENT
Start: 2023-03-07 | End: 2023-03-10

## 2023-03-07 RX ORDER — CEFEPIME 1 G/1
2000 INJECTION, POWDER, FOR SOLUTION INTRAMUSCULAR; INTRAVENOUS EVERY 12 HOURS
Refills: 0 | Status: DISCONTINUED | OUTPATIENT
Start: 2023-03-07 | End: 2023-03-08

## 2023-03-07 RX ORDER — SODIUM CHLORIDE 9 MG/ML
2800 INJECTION, SOLUTION INTRAVENOUS ONCE
Refills: 0 | Status: COMPLETED | OUTPATIENT
Start: 2023-03-07 | End: 2023-03-07

## 2023-03-07 RX ORDER — SODIUM CHLORIDE 9 MG/ML
1000 INJECTION, SOLUTION INTRAVENOUS
Refills: 0 | Status: DISCONTINUED | OUTPATIENT
Start: 2023-03-07 | End: 2023-03-10

## 2023-03-07 RX ORDER — TAMSULOSIN HYDROCHLORIDE 0.4 MG/1
1 CAPSULE ORAL
Qty: 0 | Refills: 0 | DISCHARGE

## 2023-03-07 RX ORDER — DEXTROSE 50 % IN WATER 50 %
25 SYRINGE (ML) INTRAVENOUS ONCE
Refills: 0 | Status: DISCONTINUED | OUTPATIENT
Start: 2023-03-07 | End: 2023-03-10

## 2023-03-07 RX ORDER — ACETAMINOPHEN 500 MG
650 TABLET ORAL EVERY 6 HOURS
Refills: 0 | Status: DISCONTINUED | OUTPATIENT
Start: 2023-03-07 | End: 2023-03-10

## 2023-03-07 RX ORDER — POTASSIUM CHLORIDE 20 MEQ
40 PACKET (EA) ORAL EVERY 4 HOURS
Refills: 0 | Status: COMPLETED | OUTPATIENT
Start: 2023-03-07 | End: 2023-03-07

## 2023-03-07 RX ORDER — ASPIRIN/CALCIUM CARB/MAGNESIUM 324 MG
1 TABLET ORAL
Qty: 0 | Refills: 0 | DISCHARGE

## 2023-03-07 RX ORDER — ASPIRIN/CALCIUM CARB/MAGNESIUM 324 MG
81 TABLET ORAL DAILY
Refills: 0 | Status: DISCONTINUED | OUTPATIENT
Start: 2023-03-07 | End: 2023-03-10

## 2023-03-07 RX ORDER — INSULIN GLARGINE 100 [IU]/ML
15 INJECTION, SOLUTION SUBCUTANEOUS AT BEDTIME
Refills: 0 | Status: DISCONTINUED | OUTPATIENT
Start: 2023-03-07 | End: 2023-03-10

## 2023-03-07 RX ORDER — DEXTROSE 50 % IN WATER 50 %
12.5 SYRINGE (ML) INTRAVENOUS ONCE
Refills: 0 | Status: DISCONTINUED | OUTPATIENT
Start: 2023-03-07 | End: 2023-03-10

## 2023-03-07 RX ORDER — ATORVASTATIN CALCIUM 80 MG/1
40 TABLET, FILM COATED ORAL AT BEDTIME
Refills: 0 | Status: DISCONTINUED | OUTPATIENT
Start: 2023-03-07 | End: 2023-03-10

## 2023-03-07 RX ORDER — CEFTRIAXONE 500 MG/1
1000 INJECTION, POWDER, FOR SOLUTION INTRAMUSCULAR; INTRAVENOUS ONCE
Refills: 0 | Status: COMPLETED | OUTPATIENT
Start: 2023-03-07 | End: 2023-03-07

## 2023-03-07 RX ORDER — ONDANSETRON 8 MG/1
4 TABLET, FILM COATED ORAL EVERY 8 HOURS
Refills: 0 | Status: DISCONTINUED | OUTPATIENT
Start: 2023-03-07 | End: 2023-03-10

## 2023-03-07 RX ORDER — LISINOPRIL 2.5 MG/1
1 TABLET ORAL
Qty: 0 | Refills: 0 | DISCHARGE

## 2023-03-07 RX ORDER — CEFTRIAXONE 500 MG/1
1000 INJECTION, POWDER, FOR SOLUTION INTRAMUSCULAR; INTRAVENOUS ONCE
Refills: 0 | Status: DISCONTINUED | OUTPATIENT
Start: 2023-03-07 | End: 2023-03-07

## 2023-03-07 RX ORDER — AZITHROMYCIN 500 MG/1
500 TABLET, FILM COATED ORAL ONCE
Refills: 0 | Status: COMPLETED | OUTPATIENT
Start: 2023-03-07 | End: 2023-03-07

## 2023-03-07 RX ORDER — SODIUM CHLORIDE 9 MG/ML
1000 INJECTION INTRAMUSCULAR; INTRAVENOUS; SUBCUTANEOUS
Refills: 0 | Status: DISCONTINUED | OUTPATIENT
Start: 2023-03-07 | End: 2023-03-10

## 2023-03-07 RX ORDER — TAMSULOSIN HYDROCHLORIDE 0.4 MG/1
0.4 CAPSULE ORAL AT BEDTIME
Refills: 0 | Status: DISCONTINUED | OUTPATIENT
Start: 2023-03-07 | End: 2023-03-10

## 2023-03-07 RX ORDER — SODIUM CHLORIDE 9 MG/ML
1000 INJECTION, SOLUTION INTRAVENOUS ONCE
Refills: 0 | Status: COMPLETED | OUTPATIENT
Start: 2023-03-07 | End: 2023-03-07

## 2023-03-07 RX ORDER — LANOLIN ALCOHOL/MO/W.PET/CERES
3 CREAM (GRAM) TOPICAL AT BEDTIME
Refills: 0 | Status: DISCONTINUED | OUTPATIENT
Start: 2023-03-07 | End: 2023-03-10

## 2023-03-07 RX ORDER — SEMAGLUTIDE 0.68 MG/ML
1 INJECTION, SOLUTION SUBCUTANEOUS
Qty: 0 | Refills: 0 | DISCHARGE

## 2023-03-07 RX ORDER — INSULIN LISPRO 100/ML
VIAL (ML) SUBCUTANEOUS AT BEDTIME
Refills: 0 | Status: DISCONTINUED | OUTPATIENT
Start: 2023-03-07 | End: 2023-03-10

## 2023-03-07 RX ORDER — METFORMIN HYDROCHLORIDE 850 MG/1
1 TABLET ORAL
Qty: 0 | Refills: 0 | DISCHARGE

## 2023-03-07 RX ADMIN — SODIUM CHLORIDE 150 MILLILITER(S): 9 INJECTION INTRAMUSCULAR; INTRAVENOUS; SUBCUTANEOUS at 18:54

## 2023-03-07 RX ADMIN — CEFEPIME 100 MILLIGRAM(S): 1 INJECTION, POWDER, FOR SOLUTION INTRAMUSCULAR; INTRAVENOUS at 16:31

## 2023-03-07 RX ADMIN — SODIUM CHLORIDE 2800 MILLILITER(S): 9 INJECTION, SOLUTION INTRAVENOUS at 12:55

## 2023-03-07 RX ADMIN — TAMSULOSIN HYDROCHLORIDE 0.4 MILLIGRAM(S): 0.4 CAPSULE ORAL at 21:18

## 2023-03-07 RX ADMIN — ATORVASTATIN CALCIUM 40 MILLIGRAM(S): 80 TABLET, FILM COATED ORAL at 21:17

## 2023-03-07 RX ADMIN — CLOPIDOGREL BISULFATE 75 MILLIGRAM(S): 75 TABLET, FILM COATED ORAL at 21:17

## 2023-03-07 RX ADMIN — Medication 40 MILLIEQUIVALENT(S): at 21:17

## 2023-03-07 RX ADMIN — Medication 2: at 18:54

## 2023-03-07 RX ADMIN — Medication 650 MILLIGRAM(S): at 12:55

## 2023-03-07 RX ADMIN — INSULIN GLARGINE 15 UNIT(S): 100 INJECTION, SOLUTION SUBCUTANEOUS at 22:14

## 2023-03-07 RX ADMIN — Medication 40 MILLIEQUIVALENT(S): at 18:55

## 2023-03-07 RX ADMIN — CEFTRIAXONE 1000 MILLIGRAM(S): 500 INJECTION, POWDER, FOR SOLUTION INTRAMUSCULAR; INTRAVENOUS at 13:12

## 2023-03-07 RX ADMIN — AZITHROMYCIN 255 MILLIGRAM(S): 500 TABLET, FILM COATED ORAL at 13:13

## 2023-03-07 RX ADMIN — SODIUM CHLORIDE 1000 MILLILITER(S): 9 INJECTION, SOLUTION INTRAVENOUS at 16:30

## 2023-03-07 NOTE — ED ADULT TRIAGE NOTE - CHIEF COMPLAINT QUOTE
Fever x3days. Seen at urgent care 2 days ago COVID negative. Per son, patient had a very high fever today (unknown how high), with shivering, generalized weakness and difficulty walking and speaking. Tylenol taken 2 hours ago. At triage, patient awake, alert and oriented x4, answering all questions appropriately, no signs of distress noted.

## 2023-03-07 NOTE — PATIENT PROFILE ADULT - FALL HARM RISK - HARM RISK INTERVENTIONS

## 2023-03-07 NOTE — ED PROVIDER NOTE - OBJECTIVE STATEMENT
58 y/o male with no pertinent PMHx of DM2 on metformin presents to the ED c/o fever, chills, tremors x2 days. Per family at bedside pt has been having difficulty talking and speaking due to weakness. Never smoker, denies drug use. Pt states that he has 2 stents in his heart. Pt recently travelled from [place] in February and took some abx he had from there without relief, he is unsure which abx he took. Pt is allergic to penicillin, states his arms swell when he takes it.

## 2023-03-07 NOTE — H&P ADULT - ASSESSMENT
57 year old male with H/O CAD S/P stents 1 year ago by Dr Madrid, DM-2, BPH presented to ED with fever for 2 days associated with chills, burning and frequent urination. He was hypotensive SBP in 80s and tachycardic in ED. He was found to have UTI. He received almost 3 liters IV fluid bolus and also IV rocephin. He denies any chest pain, sob. Had one episode of vomiting at home. Complaining of weakness.     1. Sepsis due yo UTI   Admit to medical floor  Remote tele  Follow blood and urine cultures  Received IV rocephin in ED  Start IV cefepime  ID eval-case discussed with Dr Wadsworth  Continue IV fluid  Hold BP medications  Follow clinically.    2. Hypokalemia  Replace and follow    3. DM-2  Start ISS  Hold po meds    4. CAD S/P stents  Continue outpatient cardiac medications  Consult Dr Corrigan    5. BPH-continue flomax    Full code

## 2023-03-07 NOTE — H&P ADULT - NSHPPHYSICALEXAM_GEN_ALL_CORE
Vital Signs Last 24 Hrs  T(C): 36.8 (07 Mar 2023 15:13), Max: 39.3 (07 Mar 2023 12:16)  T(F): 98.2 (07 Mar 2023 15:13), Max: 102.7 (07 Mar 2023 12:16)  HR: 102 (07 Mar 2023 15:13) (102 - 156)  BP: 92/73 (07 Mar 2023 15:13) (92/73 - 106/67)  BP(mean): 79 (07 Mar 2023 15:13) (79 - 82)  RR: 18 (07 Mar 2023 15:13) (18 - 22)  SpO2: 98% (07 Mar 2023 15:13) (96% - 98%)    Parameters below as of 07 Mar 2023 15:13  Patient On (Oxygen Delivery Method): room air            · CONSTITUTIONAL: Well appearing, awake, alert, oriented to person, place, time/situation and in no apparent distress.  · ENMT: Airway patent, Nasal mucosa clear. Mouth with normal mucosa. Throat has no vesicles, no oropharyngeal exudates and uvula is midline.  · EYES: Clear bilaterally, pupils equal, round and reactive to light.  · CARDIAC: +Tachycardic rate  · RESPIRATORY: Breath sounds clear and equal bilaterally.  · GASTROINTESTINAL: Abdomen soft, non-tender, no guarding.  · MUSCULOSKELETAL: Spine appears normal, range of motion is not limited, no muscle or joint tenderness  · NEUROLOGICAL: Alert and oriented, no focal deficits, no motor or sensory deficits. +weak  · SKIN: Skin normal color for race, warm, dry and intact. No evidence of rash.

## 2023-03-07 NOTE — H&P ADULT - HISTORY OF PRESENT ILLNESS
57 year old male with H/O CAD S/P stents 1 year ago by Dr Madrid, DM-2, BPH presented to ED with fever for 2 days associated with chills, burning and frequent urination. He was hypotensive SBP in 80s and tachycardic in ED. He was found to have UTI. He received almost 3 liters IV fluid bolus and also IV rocephin. He denies any chest pain, sob. Had one episode of vomiting at home. Complaining of weakness.

## 2023-03-07 NOTE — H&P ADULT - NSHPLABSRESULTS_GEN_ALL_CORE
13.7   3.71  )-----------( 154      ( 07 Mar 2023 12:30 )             41.5     07 Mar 2023 12:30    133    |  107    |  15     ----------------------------<  253    3.3     |  18     |  0.97     Ca    9.2        07 Mar 2023 12:30    TPro  8.3    /  Alb  3.5    /  TBili  0.8    /  DBili  x      /  AST  29     /  ALT  42     /  AlkPhos  98     07 Mar 2023 12:30    LIVER FUNCTIONS - ( 07 Mar 2023 12:30 )  Alb: 3.5 g/dL / Pro: 8.3 gm/dL / ALK PHOS: 98 U/L / ALT: 42 U/L / AST: 29 U/L / GGT: x           PT/INR - ( 07 Mar 2023 12:30 )   PT: 13.9 sec;   INR: 1.20 ratio         PTT - ( 07 Mar 2023 12:30 )  PTT:23.4 sec  CAPILLARY BLOOD GLUCOSE            Urinalysis Basic - ( 07 Mar 2023 12:30 )    Color: Yellow / Appearance: Clear / S.025 / pH: x  Gluc: x / Ketone: Small  / Bili: Small / Urobili: 1   Blood: x / Protein: 100 / Nitrite: Positive   Leuk Esterase: Small / RBC: 3-5 /HPF / WBC 26-50 /HPF   Sq Epi: x / Non Sq Epi: Few / Bacteria: Moderate

## 2023-03-08 LAB
-  CTX-M RESISTANCE MARKER: SIGNIFICANT CHANGE UP
-  ESBL: SIGNIFICANT CHANGE UP
A1C WITH ESTIMATED AVERAGE GLUCOSE RESULT: 8.5 % — HIGH (ref 4–5.6)
ANION GAP SERPL CALC-SCNC: 5 MMOL/L — SIGNIFICANT CHANGE UP (ref 5–17)
BUN SERPL-MCNC: 9 MG/DL — SIGNIFICANT CHANGE UP (ref 7–23)
CALCIUM SERPL-MCNC: 8.5 MG/DL — SIGNIFICANT CHANGE UP (ref 8.5–10.1)
CHLORIDE SERPL-SCNC: 114 MMOL/L — HIGH (ref 96–108)
CO2 SERPL-SCNC: 20 MMOL/L — LOW (ref 22–31)
CREAT SERPL-MCNC: 0.68 MG/DL — SIGNIFICANT CHANGE UP (ref 0.5–1.3)
E COLI DNA BLD POS QL NAA+NON-PROBE: SIGNIFICANT CHANGE UP
EGFR: 108 ML/MIN/1.73M2 — SIGNIFICANT CHANGE UP
ESTIMATED AVERAGE GLUCOSE: 197 MG/DL — HIGH (ref 68–114)
GLUCOSE BLDC GLUCOMTR-MCNC: 149 MG/DL — HIGH (ref 70–99)
GLUCOSE BLDC GLUCOMTR-MCNC: 160 MG/DL — HIGH (ref 70–99)
GLUCOSE BLDC GLUCOMTR-MCNC: 177 MG/DL — HIGH (ref 70–99)
GLUCOSE BLDC GLUCOMTR-MCNC: 224 MG/DL — HIGH (ref 70–99)
GLUCOSE SERPL-MCNC: 154 MG/DL — HIGH (ref 70–99)
GRAM STN FLD: SIGNIFICANT CHANGE UP
HCT VFR BLD CALC: 33.5 % — LOW (ref 39–50)
HCV AB S/CO SERPL IA: 0.06 S/CO — SIGNIFICANT CHANGE UP (ref 0–0.99)
HCV AB SERPL-IMP: SIGNIFICANT CHANGE UP
HGB BLD-MCNC: 10.8 G/DL — LOW (ref 13–17)
LACTATE SERPL-SCNC: 0.9 MMOL/L — SIGNIFICANT CHANGE UP (ref 0.7–2)
MCHC RBC-ENTMCNC: 26.7 PG — LOW (ref 27–34)
MCHC RBC-ENTMCNC: 32.2 GM/DL — SIGNIFICANT CHANGE UP (ref 32–36)
MCV RBC AUTO: 82.9 FL — SIGNIFICANT CHANGE UP (ref 80–100)
METHOD TYPE: SIGNIFICANT CHANGE UP
PLATELET # BLD AUTO: 139 K/UL — LOW (ref 150–400)
POTASSIUM SERPL-MCNC: 3.8 MMOL/L — SIGNIFICANT CHANGE UP (ref 3.5–5.3)
POTASSIUM SERPL-SCNC: 3.8 MMOL/L — SIGNIFICANT CHANGE UP (ref 3.5–5.3)
RBC # BLD: 4.04 M/UL — LOW (ref 4.2–5.8)
RBC # FLD: 13.9 % — SIGNIFICANT CHANGE UP (ref 10.3–14.5)
SODIUM SERPL-SCNC: 139 MMOL/L — SIGNIFICANT CHANGE UP (ref 135–145)
SPECIMEN SOURCE: SIGNIFICANT CHANGE UP
WBC # BLD: 11.27 K/UL — HIGH (ref 3.8–10.5)
WBC # FLD AUTO: 11.27 K/UL — HIGH (ref 3.8–10.5)

## 2023-03-08 PROCEDURE — 99233 SBSQ HOSP IP/OBS HIGH 50: CPT

## 2023-03-08 RX ORDER — MEROPENEM 1 G/30ML
1000 INJECTION INTRAVENOUS EVERY 8 HOURS
Refills: 0 | Status: DISCONTINUED | OUTPATIENT
Start: 2023-03-08 | End: 2023-03-10

## 2023-03-08 RX ADMIN — CEFEPIME 100 MILLIGRAM(S): 1 INJECTION, POWDER, FOR SOLUTION INTRAMUSCULAR; INTRAVENOUS at 05:49

## 2023-03-08 RX ADMIN — Medication 1 TABLET(S): at 11:06

## 2023-03-08 RX ADMIN — INSULIN GLARGINE 15 UNIT(S): 100 INJECTION, SOLUTION SUBCUTANEOUS at 21:47

## 2023-03-08 RX ADMIN — MEROPENEM 100 MILLIGRAM(S): 1 INJECTION INTRAVENOUS at 13:57

## 2023-03-08 RX ADMIN — Medication 0: at 21:40

## 2023-03-08 RX ADMIN — CLOPIDOGREL BISULFATE 75 MILLIGRAM(S): 75 TABLET, FILM COATED ORAL at 09:21

## 2023-03-08 RX ADMIN — SODIUM CHLORIDE 150 MILLILITER(S): 9 INJECTION INTRAMUSCULAR; INTRAVENOUS; SUBCUTANEOUS at 18:44

## 2023-03-08 RX ADMIN — MEROPENEM 100 MILLIGRAM(S): 1 INJECTION INTRAVENOUS at 21:47

## 2023-03-08 RX ADMIN — Medication 2: at 07:55

## 2023-03-08 RX ADMIN — ATORVASTATIN CALCIUM 40 MILLIGRAM(S): 80 TABLET, FILM COATED ORAL at 21:47

## 2023-03-08 RX ADMIN — Medication 81 MILLIGRAM(S): at 09:20

## 2023-03-08 RX ADMIN — TAMSULOSIN HYDROCHLORIDE 0.4 MILLIGRAM(S): 0.4 CAPSULE ORAL at 21:47

## 2023-03-08 RX ADMIN — SODIUM CHLORIDE 150 MILLILITER(S): 9 INJECTION INTRAMUSCULAR; INTRAVENOUS; SUBCUTANEOUS at 05:49

## 2023-03-08 RX ADMIN — Medication 2: at 11:32

## 2023-03-08 NOTE — PHARMACOTHERAPY INTERVENTION NOTE - COMMENTS
Patient growing ESBL E.coli CTX-M in blood, change from cefepime to meropenem
Medication history complete, reviewed medication with patient and list provided and confirmed with DrFirst.
Modified penicillin allergy to state patient tolerated cefepime during this admission.    Victor Manuel Welch, PharmD  Clinical Pharmacy Specialist, Infectious Diseases  Tele-Antimicrobial Stewardship Program (Tele-ASP)  Tele-ASP Phone: [phone]

## 2023-03-08 NOTE — CONSULT NOTE ADULT - SUBJECTIVE AND OBJECTIVE BOX
Patient is a 57y old  Male who presents with a chief complaint of Fever    HPI:  56 y/o male with h/o CAD S/P stents 1 year ago by Dr Madrid, DM-2, BPH was admitted on 3/7 for fever for 2 days associated with chills, burning and frequent urination. He was hypotensive SBP in 80s and tachycardic in ED. He was found to have UTI. He received almost 3 liters IV fluid bolus and also IV rocephin. He denies any chest pain, sob. Had one episode of vomiting at home. Complaining of weakness. He was reported with a multiresistant organism in urine.    PMH: as above  PSH: as above  Meds: per reconciliation sheet, noted below  MEDICATIONS  (STANDING):  aspirin enteric coated 81 milliGRAM(s) Oral daily  atorvastatin 40 milliGRAM(s) Oral at bedtime  clopidogrel Tablet 75 milliGRAM(s) Oral daily  dextrose 5%. 1000 milliLiter(s) (50 mL/Hr) IV Continuous <Continuous>  dextrose 5%. 1000 milliLiter(s) (100 mL/Hr) IV Continuous <Continuous>  dextrose 50% Injectable 25 Gram(s) IV Push once  dextrose 50% Injectable 12.5 Gram(s) IV Push once  dextrose 50% Injectable 25 Gram(s) IV Push once  glucagon  Injectable 1 milliGRAM(s) IntraMuscular once  insulin glargine Injectable (LANTUS) 15 Unit(s) SubCutaneous at bedtime  insulin lispro (ADMELOG) corrective regimen sliding scale   SubCutaneous three times a day before meals  insulin lispro (ADMELOG) corrective regimen sliding scale   SubCutaneous at bedtime  meropenem  IVPB 1000 milliGRAM(s) IV Intermittent every 8 hours  multivitamin 1 Tablet(s) Oral daily  sildenafil (REVATIO) 50 milliGRAM(s) Oral <User Schedule>  sodium chloride 0.9%. 1000 milliLiter(s) (150 mL/Hr) IV Continuous <Continuous>  tamsulosin 0.4 milliGRAM(s) Oral at bedtime    MEDICATIONS  (PRN):  acetaminophen     Tablet .. 650 milliGRAM(s) Oral every 6 hours PRN Temp greater or equal to 38C (100.4F), Mild Pain (1 - 3)  aluminum hydroxide/magnesium hydroxide/simethicone Suspension 30 milliLiter(s) Oral every 4 hours PRN Dyspepsia  dextrose Oral Gel 15 Gram(s) Oral once PRN Blood Glucose LESS THAN 70 milliGRAM(s)/deciliter  melatonin 3 milliGRAM(s) Oral at bedtime PRN Insomnia  ondansetron Injectable 4 milliGRAM(s) IV Push every 8 hours PRN Nausea and/or Vomiting    Allergies    penicillin (Unknown)    Intolerances    Social: no smoking, no alcohol, no illegal drugs; no recent travel, no exposure to TB  FAMILY HISTORY:  No pertinent family history in first degree relatives      no history of premature cardiovascular disease in first degree relatives    ROS: the patient denies HA, no seizures, no dizziness, no sore throat, no nasal congestion, no blurry vision, no CP, no palpitations, no SOB, no cough, no abdominal pain, no diarrhea, no N/V, has dysuria, no leg pain, no claudication, no rash, no joint aches, no rectal pain or bleeding, no night sweats  All other systems reviewed and are negative    Vital Signs Last 24 Hrs  T(C): 37.1 (08 Mar 2023 07:58), Max: 37.1 (08 Mar 2023 07:58)  T(F): 98.8 (08 Mar 2023 07:58), Max: 98.8 (08 Mar 2023 07:58)  HR: 86 (08 Mar 2023 07:58) (84 - 102)  BP: 105/65 (08 Mar 2023 07:58) (92/73 - 105/65)  BP(mean): 79 (07 Mar 2023 15:13) (79 - 79)  RR: 18 (08 Mar 2023 07:58) (18 - 18)  SpO2: 97% (08 Mar 2023 07:58) (97% - 98%)    Parameters below as of 08 Mar 2023 07:58  Patient On (Oxygen Delivery Method): room air    PE:    Constitutional:  No acute distress  HEENT: NC/AT, EOMI, PERRLA, conjunctivae clear; ears and nose atraumatic; pharynx benign  Neck: supple; thyroid not palpable  Back: no tenderness  Respiratory: respiratory effort normal; clear to auscultation  Cardiovascular: S1S2 regular, no murmurs  Abdomen: soft, not tender, not distended, positive BS; no liver or spleen organomegaly  Genitourinary: no suprapubic tenderness  Lymphatic: no LN palpable  Musculoskeletal: no muscle tenderness, no joint swelling or tenderness  Extremities: no pedal edema  Neurological/ Psychiatric: AxOx3, judgement and insight normal; moving all extremities  Skin: no rashes; no palpable lesions    Labs: all available labs reviewed                        10.8   11.27 )-----------( 139      ( 08 Mar 2023 08:07 )             33.5     03-08    139  |  114<H>  |  9   ----------------------------<  154<H>  3.8   |  20<L>  |  0.68    Ca    8.5      08 Mar 2023 08:07    TPro  8.3  /  Alb  3.5  /  TBili  0.8  /  DBili  x   /  AST  29  /  ALT  42  /  AlkPhos  98  03-07     LIVER FUNCTIONS - ( 07 Mar 2023 12:30 )  Alb: 3.5 g/dL / Pro: 8.3 gm/dL / ALK PHOS: 98 U/L / ALT: 42 U/L / AST: 29 U/L / GGT: x           Urinalysis Basic - ( 07 Mar 2023 12:30 )    Color: Yellow / Appearance: Clear / S.025 / pH: x  Gluc: x / Ketone: Small  / Bili: Small / Urobili: 1   Blood: x / Protein: 100 / Nitrite: Positive   Leuk Esterase: Small / RBC: 3-5 /HPF / WBC 26-50 /HPF   Sq Epi: x / Non Sq Epi: Few / Bacteria: Moderate    (- @ 12:30)  NotDete      Culture - Blood (collected 07 Mar 2023 13:16)  Source: .Blood Blood-Peripheral  Gram Stain (08 Mar 2023 08:07):    Growth in aerobic bottle: Gram Negative Rods  Preliminary Report (08 Mar 2023 08:07):    Growth in aerobic bottle: Gram Negative Rods  Organism: Blood Culture PCR (08 Mar 2023 09:34)  Organism: Blood Culture PCR (08 Mar 2023 09:34)      -  CTX-M Resistance Marker: Detec      -  ESBL: Detec      -  Escherichia coli: Detec      Method Type: PCR        Radiology: all available radiological tests reviewed    Advanced directives addressed: full resuscitation
Patient is a 57y old  Male who presents with a chief complaint of Fever (08 Mar 2023 14:46)    ________________________________  RYDER BONNER is a 57y year old Male with a past medical history of coronary disease status post PCI to the RCA in second diagonal branch 2022, mitral valve regurgitation, hypertension, hyperlipidemia and diabetes.  The patient presented to the ER for generalized weakness, chills, burning and frequent urination.  In ER he was noted to be hypotensive, tachycardic with elevated lactic acid.  He responded well to IV fluids.  EKG performed on arrival showed narrow complex tachycardia, read as SVT.  Upon my review, this appears to be sinus tachycardia.  ER telemetry also reviewed showing sinus tachycardia.  He is doing well, and feels much better today.  No chest discomfort.  No shortness of breath.  Blood pressure stable today.  Chest x-ray shows no abnormalities.    PREVIOUS CARDIAC WORKUP:    Echocardiogram May 15, 2020  --There is mild left ventricular hypertrophy.  --LV global wall motion is normal.  --LV ejection fraction (60 %) is normal.  --Left ventricular ejection fraction 60 to 65%.  --There is bileaflet mitral valve prolapse. There is mild to moderate mitral regurgitation.  --There is mild tricuspid regurgitation.  --There is trace pulmonic regurgitation.  --The right atrial pressure is normal (0 - 5 mm Hg). There is no pulmonary hypertension.  --There is no pericardial effusion.       Cardiac Catheterization 2022  Two-vessel coronary artery disease (LAD, RCA).  Normal LV systolic function. Estimated EF 55%.  Normal LVEDP 11 mmHg.  No aortic valve stenosis.  Successful coronary intervention JORGE of mid RCA.  Successful coronary intervention JORGE of D2.  Estimated blood loss: 0-50 cc.    ________________________________  Review of systems: A 10 point review of system has been performed, and is negative except for what has been mentioned in the above history of present illness.     PAST MEDICAL & SURGICAL HISTORY:  Diabetes      No significant past surgical history        FAMILY HISTORY:  No pertinent family history in first degree relatives         SOCIAL HISTORY: The patient denies any tobacco abuse, alcohol abuse or illicit drug use.    ALLERGIES:  penicillin (Unknown)    Home Medications:  Aspirin Enteric Coated 81 mg oral delayed release tablet: 1 tab(s) orally once a day (07 Mar 2023 15:56)  clopidogrel 75 mg oral tablet: 1 tab(s) orally once a day (07 Mar 2023 15:56)  glipiZIDE 10 mg oral tablet, extended release: 1 tab(s) orally once a day (07 Mar 2023 15:56)  lisinopril 2.5 mg oral tablet: 1 tab(s) orally once a day (07 Mar 2023 15:56)  metFORMIN 1000 mg oral tablet: 1 tab(s) orally 2 times a day (07 Mar 2023 15:56)  Multiple Vitamins oral tablet: 1 tab(s) orally once a day (07 Mar 2023 15:56)  Ozempic (1 mg dose) 4 mg/3 mL subcutaneous solution: 1 milligram(s) subcutaneously once a week (07 Mar 2023 15:56)  rosuvastatin 40 mg oral tablet: 1 tab(s) orally once a day (07 Mar 2023 15:56)  sildenafil 50 mg oral tablet: 1 tab(s) orally as needed  ***Patient has active RX for this from - unable to verify if pt is currently taking*** (07 Mar 2023 15:56)  tamsulosin 0.4 mg oral capsule: 1 cap(s) orally once a day (07 Mar 2023 15:56)    MEDICATIONS  (STANDING):  aspirin enteric coated 81 milliGRAM(s) Oral daily  atorvastatin 40 milliGRAM(s) Oral at bedtime  clopidogrel Tablet 75 milliGRAM(s) Oral daily  dextrose 5%. 1000 milliLiter(s) (50 mL/Hr) IV Continuous <Continuous>  dextrose 5%. 1000 milliLiter(s) (100 mL/Hr) IV Continuous <Continuous>  dextrose 50% Injectable 25 Gram(s) IV Push once  dextrose 50% Injectable 12.5 Gram(s) IV Push once  dextrose 50% Injectable 25 Gram(s) IV Push once  glucagon  Injectable 1 milliGRAM(s) IntraMuscular once  insulin glargine Injectable (LANTUS) 15 Unit(s) SubCutaneous at bedtime  insulin lispro (ADMELOG) corrective regimen sliding scale   SubCutaneous three times a day before meals  insulin lispro (ADMELOG) corrective regimen sliding scale   SubCutaneous at bedtime  meropenem  IVPB 1000 milliGRAM(s) IV Intermittent every 8 hours  multivitamin 1 Tablet(s) Oral daily  sildenafil (REVATIO) 50 milliGRAM(s) Oral <User Schedule>  sodium chloride 0.9%. 1000 milliLiter(s) (150 mL/Hr) IV Continuous <Continuous>  tamsulosin 0.4 milliGRAM(s) Oral at bedtime    MEDICATIONS  (PRN):  acetaminophen     Tablet .. 650 milliGRAM(s) Oral every 6 hours PRN Temp greater or equal to 38C (100.4F), Mild Pain (1 - 3)  aluminum hydroxide/magnesium hydroxide/simethicone Suspension 30 milliLiter(s) Oral every 4 hours PRN Dyspepsia  dextrose Oral Gel 15 Gram(s) Oral once PRN Blood Glucose LESS THAN 70 milliGRAM(s)/deciliter  melatonin 3 milliGRAM(s) Oral at bedtime PRN Insomnia  ondansetron Injectable 4 milliGRAM(s) IV Push every 8 hours PRN Nausea and/or Vomiting    Vital Signs Last 24 Hrs  T(C): 37.1 (08 Mar 2023 07:58), Max: 37.1 (08 Mar 2023 07:58)  T(F): 98.8 (08 Mar 2023 07:58), Max: 98.8 (08 Mar 2023 07:58)  HR: 86 (08 Mar 2023 07:58) (84 - 99)  BP: 105/65 (08 Mar 2023 07:58) (96/64 - 105/65)  BP(mean): --  RR: 18 (08 Mar 2023 07:58) (18 - 18)  SpO2: 97% (08 Mar 2023 07:58) (97% - 98%)    Parameters below as of 08 Mar 2023 07:58  Patient On (Oxygen Delivery Method): room air      I&O's Summary    07 Mar 2023 07:01  -  08 Mar 2023 07:00  --------------------------------------------------------  IN: 1650 mL / OUT: 700 mL / NET: 950 mL      ________________________________  GENERAL APPEARANCE:  No acute distress  HEAD: normocephalic, atraumatic  NECK: supple, no jugular venous distention, no carotid bruit    HEART: Regular rate and rhythm, S1, S2 normal, 1/6 murmur    CHEST:  No anterior chest wall tenderness    LUNGS:  Clear to auscultation, without any wheezing, rhonchi or rales    ABDOMEN: soft, nontender, nondistended, with positive bowel sounds appreciated  EXTREMITIES: no edema.   NEURO: Alert and oriented x3  PSYC:  Normal affect  SKIN:  Dry  ________________________________   TELEMETRY:    ECG:    LABS:                        10.8   11.27 )-----------( 139      ( 08 Mar 2023 08:07 )             33.5             03-08    139  |  114<H>  |  9   ----------------------------<  154<H>  3.8   |  20<L>  |  0.68    Ca    8.5      08 Mar 2023 08:07    TPro  8.3  /  Alb  3.5  /  TBili  0.8  /  DBili  x   /  AST  29  /  ALT  42  /  AlkPhos  98  03-07      LIVER FUNCTIONS - ( 07 Mar 2023 12:30 )  Alb: 3.5 g/dL / Pro: 8.3 gm/dL / ALK PHOS: 98 U/L / ALT: 42 U/L / AST: 29 U/L / GGT: x         PT/INR - ( 07 Mar 2023 12:30 )   PT: 13.9 sec;   INR: 1.20 ratio         PTT - ( 07 Mar 2023 12:30 )  PTT:23.4 sec  Urinalysis Basic - ( 07 Mar 2023 12:30 )    Color: Yellow / Appearance: Clear / S.025 / pH: x  Gluc: x / Ketone: Small  / Bili: Small / Urobili: 1   Blood: x / Protein: 100 / Nitrite: Positive   Leuk Esterase: Small / RBC: 3-5 /HPF / WBC 26-50 /HPF   Sq Epi: x / Non Sq Epi: Few / Bacteria: Moderate        PT/INR - ( 07 Mar 2023 12:30 )   PT: 13.9 sec;   INR: 1.20 ratio         PTT - ( 07 Mar 2023 12:30 )  PTT:23.4 sec  Urinalysis Basic - ( 07 Mar 2023 12:30 )    Color: Yellow / Appearance: Clear / S.025 / pH: x  Gluc: x / Ketone: Small  / Bili: Small / Urobili: 1   Blood: x / Protein: 100 / Nitrite: Positive   Leuk Esterase: Small / RBC: 3-5 /HPF / WBC 26-50 /HPF   Sq Epi: x / Non Sq Epi: Few / Bacteria: Moderate             ________________________________    RADIOLOGY & ADDITIONAL STUDIES:   COMPARISON: 2018    FINDINGS:  An AP portable chest x-ray shows clear lungs bilaterally. No infiltrates   are seen. There is no pneumothorax. There are no pleural effusions. There   is no hilar or mediastinal widening. The cardiac silhouette is normal for   the projection, without pulmonary edema. The bony thorax is grossly   intact.    IMPRESSION: Clear lungs with no acute cardiopulmonary abnormalities.    --- End of Report ---    ________________________________    ASSESSMENT:  Coronary disease status post PCI 2022  Hypotensive shock/sepsis due to UTI  Hypertension  Hyperlipidemia  Diabetes    PLAN:  In summary, this is a 57y Male with a past medical history of  coronary disease status post PCI to the RCA in second diagonal branch 2022, mitral valve regurgitation, hypertension, hyperlipidemia and diabetes.  He was admitted for septic shock.  Responded well to IV fluids.  EKG read as SVT, however this appears to be sinus tachycardia in setting of shock.  Heart rate subsequently improved.  No need for beta-blocker.  Rest of management per primary.  Outpatient echo to reassess mitral valve insufficiency.  We will follow as needed.    ____________________________________________  (Dragon Dictation software used). Thank you for allowing me to participate in the care of your patient. Please contact me should any questions arise.    MARC Wilks DO, [facility]  Office: [phone]

## 2023-03-08 NOTE — CONSULT NOTE ADULT - ASSESSMENT
58 y/o male with h/o CAD S/P stents 1 year ago by Dr Madrid, DM-2, BPH was admitted on 3/7 for fever for 2 days associated with chills, burning and frequent urination. He was hypotensive SBP in 80s and tachycardic in ED. He was found to have UTI. He received almost 3 liters IV fluid bolus and also IV rocephin. He denies any chest pain, sob. Had one episode of vomiting at home. Complaining of weakness. He was reported with multiresistant organism bacteremia.    1. Sepsis with ESBL an CTX-M E.coli. Pyuria. Likely UTI. Probable acute on chronic prostatitis. Allergy to PCN.   -cultures reviewed  -obtain urine  c/s  -start meropenem 1 gm IV q8h  -reason for abx use and side effects reviewed with patient; monitor BMP  -monitor closely in mike of PCN allergy history  -tolerating abx well so far; no side effects noted  -contact isolation  -old chart reviewed to assess prior cultures  -monitor temps  -f/u CBC  -supportive care  2. Other issues:   -care per medicine     56 y/o male with h/o CAD S/P stents 1 year ago by Dr Madrid, DM-2, BPH was admitted on 3/7 for fever for 2 days associated with chills, burning and frequent urination. He was hypotensive SBP in 80s and tachycardic in ED. He was found to have UTI. He received almost 3 liters IV fluid bolus and also IV rocephin. He denies any chest pain, sob. Had one episode of vomiting at home. Complaining of weakness. He was reported with multiresistant organism bacteremia.    1. Sepsis with ESBL an CTX-M E.coli. Pyuria. Likely UTI. Probable acute on chronic prostatitis. Allergy to PCN.   -leukocytosis  -cultures reviewed  -obtain urine  c/s  -start meropenem 1 gm IV q8h  -reason for abx use and side effects reviewed with patient; monitor BMP  -monitor closely in mike of PCN allergy history  -tolerating abx well so far; no side effects noted  -contact isolation  -old chart reviewed to assess prior cultures  -consider urology evaluation  -monitor temps  -f/u CBC  -supportive care  2. Other issues:   -care per medicine    d/w medical team

## 2023-03-09 LAB
-  AMIKACIN: SIGNIFICANT CHANGE UP
-  AMPICILLIN/SULBACTAM: SIGNIFICANT CHANGE UP
-  AMPICILLIN: SIGNIFICANT CHANGE UP
-  AZTREONAM: SIGNIFICANT CHANGE UP
-  CEFAZOLIN: SIGNIFICANT CHANGE UP
-  CEFEPIME: SIGNIFICANT CHANGE UP
-  CEFTRIAXONE: SIGNIFICANT CHANGE UP
-  CIPROFLOXACIN: SIGNIFICANT CHANGE UP
-  ERTAPENEM: SIGNIFICANT CHANGE UP
-  GENTAMICIN: SIGNIFICANT CHANGE UP
-  IMIPENEM: SIGNIFICANT CHANGE UP
-  LEVOFLOXACIN: SIGNIFICANT CHANGE UP
-  MEROPENEM: SIGNIFICANT CHANGE UP
-  PIPERACILLIN/TAZOBACTAM: SIGNIFICANT CHANGE UP
-  TOBRAMYCIN: SIGNIFICANT CHANGE UP
-  TRIMETHOPRIM/SULFAMETHOXAZOLE: SIGNIFICANT CHANGE UP
CULTURE RESULTS: SIGNIFICANT CHANGE UP
GLUCOSE BLDC GLUCOMTR-MCNC: 149 MG/DL — HIGH (ref 70–99)
GLUCOSE BLDC GLUCOMTR-MCNC: 229 MG/DL — HIGH (ref 70–99)
GLUCOSE BLDC GLUCOMTR-MCNC: 272 MG/DL — HIGH (ref 70–99)
GLUCOSE BLDC GLUCOMTR-MCNC: 272 MG/DL — HIGH (ref 70–99)
METHOD TYPE: SIGNIFICANT CHANGE UP
ORGANISM # SPEC MICROSCOPIC CNT: SIGNIFICANT CHANGE UP
SPECIMEN SOURCE: SIGNIFICANT CHANGE UP

## 2023-03-09 PROCEDURE — 99232 SBSQ HOSP IP/OBS MODERATE 35: CPT

## 2023-03-09 RX ADMIN — SODIUM CHLORIDE 150 MILLILITER(S): 9 INJECTION INTRAMUSCULAR; INTRAVENOUS; SUBCUTANEOUS at 15:19

## 2023-03-09 RX ADMIN — Medication 81 MILLIGRAM(S): at 09:41

## 2023-03-09 RX ADMIN — MEROPENEM 100 MILLIGRAM(S): 1 INJECTION INTRAVENOUS at 21:08

## 2023-03-09 RX ADMIN — Medication 650 MILLIGRAM(S): at 21:05

## 2023-03-09 RX ADMIN — TAMSULOSIN HYDROCHLORIDE 0.4 MILLIGRAM(S): 0.4 CAPSULE ORAL at 21:06

## 2023-03-09 RX ADMIN — CLOPIDOGREL BISULFATE 75 MILLIGRAM(S): 75 TABLET, FILM COATED ORAL at 09:41

## 2023-03-09 RX ADMIN — MEROPENEM 100 MILLIGRAM(S): 1 INJECTION INTRAVENOUS at 05:07

## 2023-03-09 RX ADMIN — Medication 650 MILLIGRAM(S): at 17:16

## 2023-03-09 RX ADMIN — Medication 3 MILLIGRAM(S): at 21:06

## 2023-03-09 RX ADMIN — MEROPENEM 100 MILLIGRAM(S): 1 INJECTION INTRAVENOUS at 15:00

## 2023-03-09 RX ADMIN — Medication 2: at 21:06

## 2023-03-09 RX ADMIN — Medication 4: at 12:31

## 2023-03-09 RX ADMIN — Medication 650 MILLIGRAM(S): at 21:35

## 2023-03-09 RX ADMIN — ATORVASTATIN CALCIUM 40 MILLIGRAM(S): 80 TABLET, FILM COATED ORAL at 21:06

## 2023-03-09 RX ADMIN — Medication 1 TABLET(S): at 09:42

## 2023-03-09 RX ADMIN — Medication 650 MILLIGRAM(S): at 05:09

## 2023-03-09 RX ADMIN — Medication 650 MILLIGRAM(S): at 05:39

## 2023-03-09 RX ADMIN — SODIUM CHLORIDE 150 MILLILITER(S): 9 INJECTION INTRAMUSCULAR; INTRAVENOUS; SUBCUTANEOUS at 15:26

## 2023-03-09 RX ADMIN — Medication 650 MILLIGRAM(S): at 15:26

## 2023-03-09 RX ADMIN — INSULIN GLARGINE 15 UNIT(S): 100 INJECTION, SOLUTION SUBCUTANEOUS at 21:06

## 2023-03-09 RX ADMIN — Medication 6: at 17:33

## 2023-03-10 VITALS
OXYGEN SATURATION: 99 % | RESPIRATION RATE: 18 BRPM | HEART RATE: 73 BPM | DIASTOLIC BLOOD PRESSURE: 86 MMHG | TEMPERATURE: 100 F | SYSTOLIC BLOOD PRESSURE: 138 MMHG

## 2023-03-10 LAB
-  AMIKACIN: SIGNIFICANT CHANGE UP
-  AMOXICILLIN/CLAVULANIC ACID: SIGNIFICANT CHANGE UP
-  AMPICILLIN/SULBACTAM: SIGNIFICANT CHANGE UP
-  AMPICILLIN: SIGNIFICANT CHANGE UP
-  AZTREONAM: SIGNIFICANT CHANGE UP
-  CEFAZOLIN: SIGNIFICANT CHANGE UP
-  CEFEPIME: SIGNIFICANT CHANGE UP
-  CEFTRIAXONE: SIGNIFICANT CHANGE UP
-  CEFUROXIME: SIGNIFICANT CHANGE UP
-  CIPROFLOXACIN: SIGNIFICANT CHANGE UP
-  ERTAPENEM: SIGNIFICANT CHANGE UP
-  GENTAMICIN: SIGNIFICANT CHANGE UP
-  IMIPENEM: SIGNIFICANT CHANGE UP
-  LEVOFLOXACIN: SIGNIFICANT CHANGE UP
-  MEROPENEM: SIGNIFICANT CHANGE UP
-  NITROFURANTOIN: SIGNIFICANT CHANGE UP
-  PIPERACILLIN/TAZOBACTAM: SIGNIFICANT CHANGE UP
-  TOBRAMYCIN: SIGNIFICANT CHANGE UP
-  TRIMETHOPRIM/SULFAMETHOXAZOLE: SIGNIFICANT CHANGE UP
CULTURE RESULTS: SIGNIFICANT CHANGE UP
GLUCOSE BLDC GLUCOMTR-MCNC: 167 MG/DL — HIGH (ref 70–99)
GLUCOSE BLDC GLUCOMTR-MCNC: 189 MG/DL — HIGH (ref 70–99)
GLUCOSE BLDC GLUCOMTR-MCNC: 218 MG/DL — HIGH (ref 70–99)
METHOD TYPE: SIGNIFICANT CHANGE UP
ORGANISM # SPEC MICROSCOPIC CNT: SIGNIFICANT CHANGE UP
ORGANISM # SPEC MICROSCOPIC CNT: SIGNIFICANT CHANGE UP
SPECIMEN SOURCE: SIGNIFICANT CHANGE UP

## 2023-03-10 PROCEDURE — 99239 HOSP IP/OBS DSCHRG MGMT >30: CPT

## 2023-03-10 RX ORDER — ERTAPENEM SODIUM 1 G/1
1000 INJECTION, POWDER, LYOPHILIZED, FOR SOLUTION INTRAMUSCULAR; INTRAVENOUS EVERY 24 HOURS
Refills: 0 | Status: DISCONTINUED | OUTPATIENT
Start: 2023-03-10 | End: 2023-03-10

## 2023-03-10 RX ORDER — ERTAPENEM SODIUM 1 G/1
1 INJECTION, POWDER, LYOPHILIZED, FOR SOLUTION INTRAMUSCULAR; INTRAVENOUS
Qty: 14 | Refills: 0
Start: 2023-03-10 | End: 2023-03-23

## 2023-03-10 RX ADMIN — Medication 1 TABLET(S): at 09:33

## 2023-03-10 RX ADMIN — ERTAPENEM SODIUM 120 MILLIGRAM(S): 1 INJECTION, POWDER, LYOPHILIZED, FOR SOLUTION INTRAMUSCULAR; INTRAVENOUS at 15:55

## 2023-03-10 RX ADMIN — CLOPIDOGREL BISULFATE 75 MILLIGRAM(S): 75 TABLET, FILM COATED ORAL at 09:33

## 2023-03-10 RX ADMIN — Medication 2: at 12:24

## 2023-03-10 RX ADMIN — Medication 81 MILLIGRAM(S): at 09:32

## 2023-03-10 RX ADMIN — Medication 4: at 08:14

## 2023-03-10 RX ADMIN — MEROPENEM 100 MILLIGRAM(S): 1 INJECTION INTRAVENOUS at 05:01

## 2023-03-10 RX ADMIN — Medication 2: at 18:02

## 2023-03-10 RX ADMIN — Medication 650 MILLIGRAM(S): at 15:58

## 2023-03-10 NOTE — DISCHARGE NOTE PROVIDER - NSDCMRMEDTOKEN_GEN_ALL_CORE_FT
Aspirin Enteric Coated 81 mg oral delayed release tablet: 1 tab(s) orally once a day  clopidogrel 75 mg oral tablet: 1 tab(s) orally once a day  glipiZIDE 10 mg oral tablet, extended release: 1 tab(s) orally once a day  INVanz 1 g injection: 1 gram(s) intravenously once a day   lisinopril 2.5 mg oral tablet: 1 tab(s) orally once a day  metFORMIN 1000 mg oral tablet: 1 tab(s) orally 2 times a day  Multiple Vitamins oral tablet: 1 tab(s) orally once a day  Ozempic (1 mg dose) 4 mg/3 mL subcutaneous solution: 1 milligram(s) subcutaneously once a week  rosuvastatin 40 mg oral tablet: 1 tab(s) orally once a day  sildenafil 50 mg oral tablet: 1 tab(s) orally as needed  ***Patient has active RX for this from 12/29- unable to verify if pt is currently taking***  tamsulosin 0.4 mg oral capsule: 1 cap(s) orally once a day

## 2023-03-10 NOTE — DISCHARGE NOTE PROVIDER - CARE PROVIDER_API CALL
Yuri Bo)  Urology  [address] [place] [address]  Phone: [phone]  Fax: [phone]  Follow Up Time: 2 weeks

## 2023-03-10 NOTE — ADVANCED PRACTICE NURSE CONSULT - ASSESSMENT
Patient received in room 5E 549-2 resting in bed, awake, alert, oriented x 4, pleasant and cooperative. Risks and benefits of midline catheter placement explained to patient with verbal understanding. Gave verbal consent after all questions answered. Time out and hand hygiene performed. After unsuccessful attempt to R cephalic vein, Arrow Endurance Extended Dwell Peripheral Catheter System (LOT #[id number]) 18g, 8cm inserted to right basilic vein via ultrasound guidance using aeseptic technique. Flushes well with 20cc of NS. Brisk blood return present. End cap placed. Line secured to skin using statlock, biopatch with DSD applied. Minimal blood loss. No chest x ray needed to verify placement. Ok to use.

## 2023-03-10 NOTE — PROGRESS NOTE ADULT - SUBJECTIVE AND OBJECTIVE BOX
57 year old male with H/O CAD S/P stents 1 year ago by Dr Madrid, DM-2, BPH presented to ED with fever for 2 days associated with chills, burning and frequent urination. He was hypotensive SBP in 80s and tachycardic in ED. He was found to have UTI. He received almost 3 liters IV fluid bolus and also IV rocephin. He denies any chest pain, sob. Had one episode of vomiting at home. Complaining of weakness.         23: Patient feels much better today            Vital Signs Last 24 Hrs  T(C): 37.1 (08 Mar 2023 07:58), Max: 37.1 (08 Mar 2023 07:58)  T(F): 98.8 (08 Mar 2023 07:58), Max: 98.8 (08 Mar 2023 07:58)  HR: 86 (08 Mar 2023 07:58) (84 - 102)  BP: 105/65 (08 Mar 2023 07:58) (92/73 - 105/65)  BP(mean): 79 (07 Mar 2023 15:13) (79 - 79)  RR: 18 (08 Mar 2023 07:58) (18 - 18)  SpO2: 97% (08 Mar 2023 07:58) (97% - 98%)    Parameters below as of 08 Mar 2023 07:58  Patient On (Oxygen Delivery Method): room air        Physical Exam:       · CONSTITUTIONAL: Well appearing, awake, alert, oriented to person, place, time/situation and in no apparent distress.  · ENMT: Airway patent, Nasal mucosa clear. Mouth with normal mucosa. Throat has no vesicles, no oropharyngeal exudates and uvula is midline.  · EYES: Clear bilaterally, pupils equal, round and reactive to light.  · CARDIAC: +Tachycardic rate  · RESPIRATORY: Breath sounds clear and equal bilaterally.  · GASTROINTESTINAL: Abdomen soft, non-tender, no guarding.  · MUSCULOSKELETAL: Spine appears normal, range of motion is not limited, no muscle or joint tenderness  · NEUROLOGICAL: Alert and oriented, no focal deficits, no motor or sensory deficits. +weak  · SKIN: Skin normal color for race, warm, dry and intact. No evidence of rash.                                10.8   11.27 )-----------( 139      ( 08 Mar 2023 08:07 )             33.5     08 Mar 2023 08:07    139    |  114    |  9      ----------------------------<  154    3.8     |  20     |  0.68     Ca    8.5        08 Mar 2023 08:07    TPro  8.3    /  Alb  3.5    /  TBili  0.8    /  DBili  x      /  AST  29     /  ALT  42     /  AlkPhos  98     07 Mar 2023 12:30    LIVER FUNCTIONS - ( 07 Mar 2023 12:30 )  Alb: 3.5 g/dL / Pro: 8.3 gm/dL / ALK PHOS: 98 U/L / ALT: 42 U/L / AST: 29 U/L / GGT: x           PT/INR - ( 07 Mar 2023 12:30 )   PT: 13.9 sec;   INR: 1.20 ratio         PTT - ( 07 Mar 2023 12:30 )  PTT:23.4 sec  CAPILLARY BLOOD GLUCOSE      POCT Blood Glucose.: 177 mg/dL (08 Mar 2023 11:31)  POCT Blood Glucose.: 160 mg/dL (08 Mar 2023 07:52)  POCT Blood Glucose.: 195 mg/dL (07 Mar 2023 22:12)  POCT Blood Glucose.: 196 mg/dL (07 Mar 2023 18:49)        Urinalysis Basic - ( 07 Mar 2023 12:30 )    Color: Yellow / Appearance: Clear / S.025 / pH: x  Gluc: x / Ketone: Small  / Bili: Small / Urobili: 1   Blood: x / Protein: 100 / Nitrite: Positive   Leuk Esterase: Small / RBC: 3-5 /HPF / WBC 26-50 /HPF   Sq Epi: x / Non Sq Epi: Few / Bacteria: Moderate        MEDICATIONS  (STANDING):  aspirin enteric coated 81 milliGRAM(s) Oral daily  atorvastatin 40 milliGRAM(s) Oral at bedtime  clopidogrel Tablet 75 milliGRAM(s) Oral daily  dextrose 5%. 1000 milliLiter(s) (50 mL/Hr) IV Continuous <Continuous>  dextrose 5%. 1000 milliLiter(s) (100 mL/Hr) IV Continuous <Continuous>  dextrose 50% Injectable 25 Gram(s) IV Push once  dextrose 50% Injectable 12.5 Gram(s) IV Push once  dextrose 50% Injectable 25 Gram(s) IV Push once  glucagon  Injectable 1 milliGRAM(s) IntraMuscular once  insulin glargine Injectable (LANTUS) 15 Unit(s) SubCutaneous at bedtime  insulin lispro (ADMELOG) corrective regimen sliding scale   SubCutaneous three times a day before meals  insulin lispro (ADMELOG) corrective regimen sliding scale   SubCutaneous at bedtime  meropenem  IVPB 1000 milliGRAM(s) IV Intermittent every 8 hours  multivitamin 1 Tablet(s) Oral daily  sildenafil (REVATIO) 50 milliGRAM(s) Oral <User Schedule>  sodium chloride 0.9%. 1000 milliLiter(s) (150 mL/Hr) IV Continuous <Continuous>  tamsulosin 0.4 milliGRAM(s) Oral at bedtime    MEDICATIONS  (PRN):  acetaminophen     Tablet .. 650 milliGRAM(s) Oral every 6 hours PRN Temp greater or equal to 38C (100.4F), Mild Pain (1 - 3)  aluminum hydroxide/magnesium hydroxide/simethicone Suspension 30 milliLiter(s) Oral every 4 hours PRN Dyspepsia  dextrose Oral Gel 15 Gram(s) Oral once PRN Blood Glucose LESS THAN 70 milliGRAM(s)/deciliter  melatonin 3 milliGRAM(s) Oral at bedtime PRN Insomnia  ondansetron Injectable 4 milliGRAM(s) IV Push every 8 hours PRN Nausea and/or Vomiting              Assessment:  · Assessment	  57 year old male with H/O CAD S/P stents 1 year ago by Dr Madrid, DM-2, BPH presented to ED with fever for 2 days associated with chills, burning and frequent urination. He was hypotensive SBP in 80s and tachycardic in ED. He was found to have UTI. He received almost 3 liters IV fluid bolus and also IV rocephin. He denies any chest pain, sob. Had one episode of vomiting at home. Complaining of weakness.     1. ESBL E coli Sepsis due to UTI   S/P IV cefepime  Now on IV meropenem  Dr Gonzales consult appreciated  Continue IV fluid for one more day, BP still low  Hold BP medications until blood pressure satble  Follow clinically.  Repeat blood cultures in few dyas time    2. Hypokalemia  Replaced    3. DM-2  On ISS  Hold po meds    4. CAD S/P stents  Continue outpatient cardiac medications  Consult Dr Corrigan    5. BPH-continue flomax    Full code  
Date of service: 03-10-23 @ 15:40    Lying in bed in NAD  Has urinary frequency  No fever    ROS: no fever or chills; denies dizziness, no HA, no SOB or cough, no abdominal pain, no diarrhea or constipation; no legs pain, no rashes    MEDICATIONS  (STANDING):  aspirin enteric coated 81 milliGRAM(s) Oral daily  atorvastatin 40 milliGRAM(s) Oral at bedtime  clopidogrel Tablet 75 milliGRAM(s) Oral daily  dextrose 5%. 1000 milliLiter(s) (50 mL/Hr) IV Continuous <Continuous>  dextrose 5%. 1000 milliLiter(s) (100 mL/Hr) IV Continuous <Continuous>  dextrose 50% Injectable 25 Gram(s) IV Push once  dextrose 50% Injectable 12.5 Gram(s) IV Push once  dextrose 50% Injectable 25 Gram(s) IV Push once  ertapenem  IVPB 1000 milliGRAM(s) IV Intermittent every 24 hours  glucagon  Injectable 1 milliGRAM(s) IntraMuscular once  insulin glargine Injectable (LANTUS) 15 Unit(s) SubCutaneous at bedtime  insulin lispro (ADMELOG) corrective regimen sliding scale   SubCutaneous three times a day before meals  insulin lispro (ADMELOG) corrective regimen sliding scale   SubCutaneous at bedtime  multivitamin 1 Tablet(s) Oral daily  sildenafil (REVATIO) 50 milliGRAM(s) Oral <User Schedule>  sodium chloride 0.9%. 1000 milliLiter(s) (150 mL/Hr) IV Continuous <Continuous>  tamsulosin 0.4 milliGRAM(s) Oral at bedtime    Vital Signs Last 24 Hrs  T(C): 36.7 (10 Mar 2023 07:36), Max: 36.7 (10 Mar 2023 07:36)  T(F): 98.1 (10 Mar 2023 07:36), Max: 98.1 (10 Mar 2023 07:36)  HR: 68 (10 Mar 2023 07:36) (65 - 68)  BP: 147/92 (10 Mar 2023 07:36) (137/91 - 147/92)  BP(mean): --  RR: 18 (10 Mar 2023 07:36) (18 - 18)  SpO2: 96% (10 Mar 2023 07:36) (96% - 97%)    Parameters below as of 10 Mar 2023 07:36  Patient On (Oxygen Delivery Method): room air     Physical exam:    Constitutional:  No acute distress  HEENT: NC/AT, EOMI, PERRLA, conjunctivae clear; ears and nose atraumatic  Neck: supple; thyroid not palpable  Back: no tenderness  Respiratory: respiratory effort normal; clear to auscultation  Cardiovascular: S1S2 regular, no murmurs  Abdomen: soft, not tender, not distended, positive BS  Genitourinary: no suprapubic tenderness  Lymphatic: no LN palpable  Musculoskeletal: no muscle tenderness, no joint swelling or tenderness  Extremities: no pedal edema  Neurological/ Psychiatric: AxOx3, moving all extremities  Skin: no rashes; no palpable lesions    Labs: reviewed                        10.8   11.27 )-----------( 139      ( 08 Mar 2023 08:07 )             33.5     03-08    139  |  114<H>  |  9   ----------------------------<  154<H>  3.8   |  20<L>  |  0.68    Ca    8.5      08 Mar 2023 08:07    TPro  8.3  /  Alb  3.5  /  TBili  0.8  /  DBili  x   /  AST  29  /  ALT  42  /  AlkPhos  98  03-07     LIVER FUNCTIONS - ( 07 Mar 2023 12:30 )  Alb: 3.5 g/dL / Pro: 8.3 gm/dL / ALK PHOS: 98 U/L / ALT: 42 U/L / AST: 29 U/L / GGT: x           Urinalysis Basic - ( 07 Mar 2023 12:30 )    Color: Yellow / Appearance: Clear / S.025 / pH: x  Gluc: x / Ketone: Small  / Bili: Small / Urobili: 1   Blood: x / Protein: 100 / Nitrite: Positive   Leuk Esterase: Small / RBC: 3-5 /HPF / WBC 26-50 /HPF   Sq Epi: x / Non Sq Epi: Few / Bacteria: Moderate    ( @ 12:30)  [facility]      Culture - Blood (collected 07 Mar 2023 13:16)  Source: .Blood Blood-Peripheral  Gram Stain (08 Mar 2023 08:07):    Growth in aerobic bottle: Gram Negative Rods  Final Report (09 Mar 2023 16:03):    Growth in aerobic bottle: Escherichia coli ESBL    ***Blood Panel PCR results on this specimen are available    approximately 3 hours after the Gram stain result.***    Gram stain, PCR, and/or culture results may not always    correspond due to difference in methodologies.    ************************************************************    This PCR assay was performed by multiplex PCR. This    Assay tests for 66 bacterial and resistance gene targets.    Please refer to the [facility] test directory    at [email or web address] for details.  Organism: Blood Culture PCR  Escherichia coli ESBL (09 Mar 2023 16:03)  Organism: Escherichia coli ESBL (09 Mar 2023 16:03)      -  Amikacin: S <=16      -  Ampicillin: R >16 These ampicillin results predict results for amoxicillin      -  Ampicillin/Sulbactam: R >16/8 Enterobacter, Klebsiella aerogenes, Citrobacter, and Serratia may develop resistance during prolonged therapy (3-4 days)      -  Aztreonam: R >16      -  Cefazolin: R >16 Enterobacter, Klebsiella aerogenes, Citrobacter, and Serratia may develop resistance during prolonged therapy (3-4 days)      -  Cefepime: R >16      -  Ceftriaxone: R >32 Enterobacter, Klebsiella aerogenes, Citrobacter, and Serratia may develop resistance during prolonged therapy      -  Ciprofloxacin: R >2      -  Ertapenem: S <=0.5      -  Gentamicin: S <=2      -  Imipenem: S <=1      -  Levofloxacin: R >4      -  Meropenem: S <=1      -  Piperacillin/Tazobactam: R 16      -  Tobramycin: S <=2      -  Trimethoprim/Sulfamethoxazole: S <=0.5/9.5      Method Type: DANIEL  Organism: Blood Culture PCR (09 Mar 2023 16:03)      -  CTX-M Resistance Marker: Detec      -  ESBL: Detec      -  Escherichia coli: Detec      Method Type: PCR    Culture - Urine (collected 07 Mar 2023 12:30)  Source: Clean Catch Clean Catch (Midstream)  Final Report (10 Mar 2023 09:28):    >100,000 CFU/ml Escherichia coli ESBL  Organism: Escherichia coli ESBL (10 Mar 2023 09:28)  Organism: Escherichia coli ESBL (10 Mar 2023 09:28)      -  Amikacin: S <=16      -  Amoxicillin/Clavulanic Acid: R >16/8      -  Ampicillin: R >16 These ampicillin results predict results for amoxicillin      -  Ampicillin/Sulbactam: R >16/8 Enterobacter, Klebsiella aerogenes, Citrobacter, and Serratia may develop resistance during prolonged therapy (3-4 days)      -  Aztreonam: R >16      -  Cefazolin: R >16 For uncomplicated UTI with K. pneumoniae, E. coli, or P. mirablis: DANIEL <=16 is sensitive and DANIEL >=32 is resistant. This also predicts results for oral agents cefaclor, cefdinir, cefpodoxime, cefprozil, cefuroxime axetil, cephalexin and locarbef for uncomplicated UTI. Note that some isolates may be susceptible to these agents while testing resistant to cefazolin.      -  Cefepime: R >16      -  Ceftriaxone: R >32 Enterobacter, Klebsiella aerogenes, Citrobacter, and Serratia may develop resistance during prolonged therapy      -  Cefuroxime: R >16      -  Ciprofloxacin: R >2      -  Ertapenem: S <=0.5      -  Gentamicin: S <=2      -  Imipenem: S <=1      -  Levofloxacin: R >4      -  Meropenem: S <=1      -  Nitrofurantoin: S <=32 Should not be used to treat pyelonephritis      -  Piperacillin/Tazobactam: S <=8      -  Tobramycin: R >8      -  Trimethoprim/Sulfamethoxazole: S <=0.5/9.5      Method Type: DANIEL    Culture - Blood (collected 07 Mar 2023 12:30)  Source: .Blood Blood-Peripheral  Preliminary Report (08 Mar 2023 19:02):    No growth to date.        Radiology: all available radiological tests reviewed    Advanced directives addressed: full resuscitation
Date of service: 23 @ 15:01    Lying in bed in NAD  Has urinary frequency  No fever    ROS: no fever or chills; denies dizziness, no HA, no SOB or cough, no abdominal pain, no diarrhea or constipation; no legs pain, no rashes    MEDICATIONS  (STANDING):  aspirin enteric coated 81 milliGRAM(s) Oral daily  atorvastatin 40 milliGRAM(s) Oral at bedtime  clopidogrel Tablet 75 milliGRAM(s) Oral daily  dextrose 5%. 1000 milliLiter(s) (50 mL/Hr) IV Continuous <Continuous>  dextrose 5%. 1000 milliLiter(s) (100 mL/Hr) IV Continuous <Continuous>  dextrose 50% Injectable 25 Gram(s) IV Push once  dextrose 50% Injectable 12.5 Gram(s) IV Push once  dextrose 50% Injectable 25 Gram(s) IV Push once  glucagon  Injectable 1 milliGRAM(s) IntraMuscular once  insulin glargine Injectable (LANTUS) 15 Unit(s) SubCutaneous at bedtime  insulin lispro (ADMELOG) corrective regimen sliding scale   SubCutaneous three times a day before meals  insulin lispro (ADMELOG) corrective regimen sliding scale   SubCutaneous at bedtime  meropenem  IVPB 1000 milliGRAM(s) IV Intermittent every 8 hours  multivitamin 1 Tablet(s) Oral daily  sildenafil (REVATIO) 50 milliGRAM(s) Oral <User Schedule>  sodium chloride 0.9%. 1000 milliLiter(s) (150 mL/Hr) IV Continuous <Continuous>  tamsulosin 0.4 milliGRAM(s) Oral at bedtime    Vital Signs Last 24 Hrs  T(C): 36.4 (09 Mar 2023 07:53), Max: 36.7 (08 Mar 2023 23:52)  T(F): 97.5 (09 Mar 2023 07:53), Max: 98.1 (08 Mar 2023 23:52)  HR: 67 (09 Mar 2023 07:53) (67 - 85)  BP: 118/79 (09 Mar 2023 07:53) (118/79 - 139/72)  BP(mean): --  RR: 18 (09 Mar 2023 07:53) (18 - 18)  SpO2: 98% (09 Mar 2023 07:53) (95% - 98%)    Parameters below as of 09 Mar 2023 07:53  Patient On (Oxygen Delivery Method): room air     Physical exam:    Constitutional:  No acute distress  HEENT: NC/AT, EOMI, PERRLA, conjunctivae clear; ears and nose atraumatic  Neck: supple; thyroid not palpable  Back: no tenderness  Respiratory: respiratory effort normal; clear to auscultation  Cardiovascular: S1S2 regular, no murmurs  Abdomen: soft, not tender, not distended, positive BS  Genitourinary: no suprapubic tenderness  Lymphatic: no LN palpable  Musculoskeletal: no muscle tenderness, no joint swelling or tenderness  Extremities: no pedal edema  Neurological/ Psychiatric: AxOx3, moving all extremities  Skin: no rashes; no palpable lesions    Labs: reviewed                        10.   11.27 )-----------( 139      ( 08 Mar 2023 08:07 )             33.5     03-08    139  |  114<H>  |  9   ----------------------------<  154<H>  3.8   |  20<L>  |  0.68    Ca    8.5      08 Mar 2023 08:07    TPro  8.3  /  Alb  3.5  /  TBili  0.8  /  DBili  x   /  AST  29  /  ALT  42  /  AlkPhos  98  03-07     LIVER FUNCTIONS - ( 07 Mar 2023 12:30 )  Alb: 3.5 g/dL / Pro: 8.3 gm/dL / ALK PHOS: 98 U/L / ALT: 42 U/L / AST: 29 U/L / GGT: x           Urinalysis Basic - ( 07 Mar 2023 12:30 )    Color: Yellow / Appearance: Clear / S.025 / pH: x  Gluc: x / Ketone: Small  / Bili: Small / Urobili: 1   Blood: x / Protein: 100 / Nitrite: Positive   Leuk Esterase: Small / RBC: 3-5 /HPF / WBC 26-50 /HPF   Sq Epi: x / Non Sq Epi: Few / Bacteria: Moderate    ( @ 12:30)  NotDete      Culture - Blood (collected 07 Mar 2023 13:16)  Source: .Blood Blood-Peripheral  Gram Stain (08 Mar 2023 08:07):    Growth in aerobic bottle: Gram Negative Rods  Preliminary Report (08 Mar 2023 08:07):    Growth in aerobic bottle: Gram Negative Rods  Organism: Blood Culture PCR (08 Mar 2023 09:34)  Organism: Blood Culture PCR (08 Mar 2023 09:34)      -  CTX-M Resistance Marker: Detec      -  ESBL: Detec      -  Escherichia coli: Detec      Method Type: PCR        Radiology: all available radiological tests reviewed    Advanced directives addressed: full resuscitation
CC: Fever  Subjective and Objective:   57 year old male with H/O CAD S/P stents 1 year ago by Dr Madrid, DM-2, BPH presented to ED with fever for 2 days associated with chills, burning and frequent urination. He was hypotensive SBP in 80s and tachycardic in ED. He was found to have UTI. He received almost 3 liters IV fluid bolus and also IV rocephin. He denies any chest pain, sob. Had one episode of vomiting at home. Complaining of weakness.       S:  03/08/23: Patient feels much better today  3/9: No complaints, comfortable.  Denies fever, chills, N, V, abd pain, CP, SOB.    REVIEW OF SYSTEMS: All other review of systems is negative unless indicated above.    Vital Signs Last 24 Hrs  T(C): 36.4 (09 Mar 2023 07:53), Max: 36.7 (08 Mar 2023 23:52)  T(F): 97.5 (09 Mar 2023 07:53), Max: 98.1 (08 Mar 2023 23:52)  HR: 67 (09 Mar 2023 07:53) (67 - 85)  BP: 118/79 (09 Mar 2023 07:53) (118/79 - 139/72)  BP(mean): --  RR: 18 (09 Mar 2023 07:53) (18 - 18)  SpO2: 98% (09 Mar 2023 07:53) (95% - 98%)    Parameters below as of 09 Mar 2023 07:53  Patient On (Oxygen Delivery Method): room air    PHYSICAL EXAM:    Constitutional: NAD, awake and alert, well-developed  HEENT: PERR, EOMI, Normal Hearing, MMM  Neck: Soft and supple  Respiratory: Breath sounds are clear bilaterally, No wheezing, rales or rhonchi  Cardiovascular: S1 and S2, regular rate and rhythm, no Murmurs, gallops or rubs  Gastrointestinal: Bowel Sounds present, soft, nontender, nondistended, no guarding, no rebound  Extremities: No peripheral edema  Neurological: A/O x 3, no focal deficits in my limited exam      MEDICATIONS  (STANDING):  aspirin enteric coated 81 milliGRAM(s) Oral daily  atorvastatin 40 milliGRAM(s) Oral at bedtime  clopidogrel Tablet 75 milliGRAM(s) Oral daily  dextrose 5%. 1000 milliLiter(s) (100 mL/Hr) IV Continuous <Continuous>  dextrose 5%. 1000 milliLiter(s) (50 mL/Hr) IV Continuous <Continuous>  dextrose 50% Injectable 25 Gram(s) IV Push once  dextrose 50% Injectable 12.5 Gram(s) IV Push once  dextrose 50% Injectable 25 Gram(s) IV Push once  glucagon  Injectable 1 milliGRAM(s) IntraMuscular once  insulin glargine Injectable (LANTUS) 15 Unit(s) SubCutaneous at bedtime  insulin lispro (ADMELOG) corrective regimen sliding scale   SubCutaneous three times a day before meals  insulin lispro (ADMELOG) corrective regimen sliding scale   SubCutaneous at bedtime  meropenem  IVPB 1000 milliGRAM(s) IV Intermittent every 8 hours  multivitamin 1 Tablet(s) Oral daily  sildenafil (REVATIO) 50 milliGRAM(s) Oral <User Schedule>  sodium chloride 0.9%. 1000 milliLiter(s) (150 mL/Hr) IV Continuous <Continuous>  tamsulosin 0.4 milliGRAM(s) Oral at bedtime    MEDICATIONS  (PRN):  acetaminophen     Tablet .. 650 milliGRAM(s) Oral every 6 hours PRN Temp greater or equal to 38C (100.4F), Mild Pain (1 - 3)  aluminum hydroxide/magnesium hydroxide/simethicone Suspension 30 milliLiter(s) Oral every 4 hours PRN Dyspepsia  dextrose Oral Gel 15 Gram(s) Oral once PRN Blood Glucose LESS THAN 70 milliGRAM(s)/deciliter  melatonin 3 milliGRAM(s) Oral at bedtime PRN Insomnia  ondansetron Injectable 4 milliGRAM(s) IV Push every 8 hours PRN Nausea and/or Vomiting                                10.8   11.27 )-----------( 139      ( 08 Mar 2023 08:07 )             33.5     03-08    139  |  114<H>  |  9   ----------------------------<  154<H>  3.8   |  20<L>  |  0.68    Ca    8.5      08 Mar 2023 08:07      CAPILLARY BLOOD GLUCOSE      POCT Blood Glucose.: 229 mg/dL (09 Mar 2023 11:53)  POCT Blood Glucose.: 149 mg/dL (09 Mar 2023 08:18)  POCT Blood Glucose.: 224 mg/dL (08 Mar 2023 20:51)  POCT Blood Glucose.: 149 mg/dL (08 Mar 2023 16:43)            Assessment/Plan:  57 year old male with H/O CAD S/P stents 1 year ago by Dr Madrid, DM-2, BPH presented to ED with fever for 2 days associated with chills, burning and frequent urination. He was hypotensive SBP in 80s and tachycardic in ED. He was found to have UTI. He received almost 3 liters IV fluid bolus and also IV rocephin. He denies any chest pain, sob. Had one episode of vomiting at home. Complaining of weakness.     1. ESBL E coli Sepsis due to UTI   -UCx and BCx growing Ecoli   -S/P IV cefepime  -Now on IV meropenem - continue per ID with plan for midline and long term IV antibiotics Friday      DM-2  On ISS  Hold po meds      CAD S/P stents  Continue outpatient cardiac medications  off low dose home lisinopril   cardio eval apprecaited      PH-continue flomax    Full code

## 2023-03-10 NOTE — DISCHARGE NOTE PROVIDER - HOSPITAL COURSE
CC: Fever  Subjective and Objective:   57 year old male with H/O CAD S/P stents 1 year ago by Dr Madrid, DM-2, BPH presented to ED with fever for 2 days associated with chills, burning and frequent urination. He was hypotensive SBP in 80s and tachycardic in ED. He was found to have UTI. He received almost 3 liters IV fluid bolus and also IV rocephin. He denies any chest pain, sob. Had one episode of vomiting at home. Complaining of weakness.     Hospital course:  Pt found to have ESBL E coli UTI with bacteremia and sepsis.  Pt initially placed on cefepime but broadened to meropenem.  Hospital course uneventful.  Likely source acute on chronic prostatitis.  Pt tolerating antibiotics.  Plan to discharge on long term course of invanz via midline.  Pt Denies fever, chills, N, V, abd pain, CP, SOB.  Told to follow up with urology Dr. Bo.  Uro made aware.    REVIEW OF SYSTEMS: All other review of systems is negative unless indicated above.    Vital Signs Last 24 Hrs  T(C): 36.7 (10 Mar 2023 07:36), Max: 36.7 (10 Mar 2023 07:36)  T(F): 98.1 (10 Mar 2023 07:36), Max: 98.1 (10 Mar 2023 07:36)  HR: 68 (10 Mar 2023 07:36) (65 - 70)  BP: 147/92 (10 Mar 2023 07:36) (131/87 - 147/92)  BP(mean): --  RR: 18 (10 Mar 2023 07:36) (18 - 18)  SpO2: 96% (10 Mar 2023 07:36) (96% - 98%)    Parameters below as of 10 Mar 2023 07:36  Patient On (Oxygen Delivery Method): room air    PHYSICAL EXAM:    Constitutional: NAD, awake and alert, well-developed  HEENT: PERR, EOMI, Normal Hearing, MMM  Neck: Soft and supple  Respiratory: Breath sounds are clear bilaterally, No wheezing, rales or rhonchi  Cardiovascular: S1 and S2, regular rate and rhythm, no Murmurs, gallops or rubs  Gastrointestinal: Bowel Sounds present, soft, nontender, nondistended, no guarding, no rebound  Extremities: No peripheral edema  Neurological: A/O x 3, no focal deficits in my limited exam    med/labs: Reviewed and interpreted     Assessment/Plan:  57 year old male with H/O CAD S/P stents 1 year ago by Dr Madrid, DM-2, BPH presented to ED with fever for 2 days associated with chills, burning and frequent urination. He was hypotensive SBP in 80s and tachycardic in ED. He was found to have UTI. He received almost 3 liters IV fluid bolus and also IV rocephin. He denies any chest pain, sob. Had one episode of vomiting at home. Complaining of weakness.     1. ESBL E coli Sepsis due to UTI   -UCx and BCx growing Ecoli ESBL  -S/P IV cefepime  -Now on IV meropenem - continue per ID with plan for midline and long term IV antibiotics today      DM-2  On ISS  Hold po meds      CAD S/P stents  Continue outpatient cardiac medications  off low dose home lisinopril   cardio eval apprecaited      PH-continue flomax    Full code    d/c home with long term antibiotics prescribed by ID, IV invanz daily.  Told to follow up with Dr. Bo.    Attending Statement: 40 minutes spent on total encounter and discharge planning.

## 2023-03-10 NOTE — DISCHARGE NOTE PROVIDER - NSDCCPCAREPLAN_GEN_ALL_CORE_FT
PRINCIPAL DISCHARGE DIAGNOSIS  Diagnosis: Sepsis  Assessment and Plan of Treatment: Due to ESBL E coli UTI / prostatitis / bacteremia  -Take antibiotics as prescribed IV per infectious disease recommendations  -Follow up Dr. Bo urologist

## 2023-03-10 NOTE — DISCHARGE NOTE NURSING/CASE MANAGEMENT/SOCIAL WORK - WILL THE PATIENT ACCEPT THE PFIZER COVID-19 VACCINE IF ELIGIBLE AND IT IS AVAILABLE?
No
PAST MEDICAL HISTORY:  Asthma     Asthma     Autism     Dyslipidemia     Enuresis     Factitious disorder     GERD (gastroesophageal reflux disease)     GERD (gastroesophageal reflux disease)     History of BPH     HLD (hyperlipidemia)     Hypothyroid     Hypothyroidism     Intellectual disability     Mood disorder     Myopia of both eyes     Obesity     Urinary retention

## 2023-03-10 NOTE — DISCHARGE NOTE NURSING/CASE MANAGEMENT/SOCIAL WORK - NSDCPEFALRISK_GEN_ALL_CORE
For information on Fall & Injury Prevention, visit: [email or web address] OR  [email or web address] OR  https://www.cdc.gov/steadi/patient.html

## 2023-03-10 NOTE — DISCHARGE NOTE NURSING/CASE MANAGEMENT/SOCIAL WORK - PATIENT PORTAL LINK FT
You can access the FollowMyHealth Patient Portal offered by [facility] by registering at the following website: [email or web address]. By joining [employer]’s FollowMyHealth portal, you will also be able to view your health information using other applications (apps) compatible with our system.

## 2023-03-10 NOTE — PROGRESS NOTE ADULT - ASSESSMENT
56 y/o male with h/o CAD S/P stents 1 year ago by Dr Madrid, DM-2, BPH was admitted on 3/7 for fever for 2 days associated with chills, burning and frequent urination. He was hypotensive SBP in 80s and tachycardic in ED. He was found to have UTI. He received almost 3 liters IV fluid bolus and also IV rocephin. He denies any chest pain, sob. Had one episode of vomiting at home. Complaining of weakness. He was reported with multiresistant organism bacteremia.    1. Sepsis with ESBL an CTX-M E.coli. Pyuria. Likely UTI. Probable acute on chronic prostatitis. Allergy to PCN.   -leukocytosis  -cultures reviewed  -on meropenem 1 gm IV q8h # 2  -tolerating abx well so far; no side effects noted  -monitor closely in mike of PCN allergy history  -repeat BC x 2 in AM  -contact isolation  -continue abx coverage   -consider urology evaluation  -monitor temps  -f/u CBC  -supportive care  2. Other issues:   -care per medicine    d/w medical team
56 y/o male with h/o CAD S/P stents 1 year ago by Dr Madrid, DM-2, BPH was admitted on 3/7 for fever for 2 days associated with chills, burning and frequent urination. He was hypotensive SBP in 80s and tachycardic in ED. He was found to have UTI. He received almost 3 liters IV fluid bolus and also IV rocephin. He denies any chest pain, sob. Had one episode of vomiting at home. Complaining of weakness. He was reported with multiresistant organism bacteremia.    1. Sepsis with ESBL an CTX-M E.coli. Pyuria. Likely UTI. Probable acute on chronic prostatitis. Allergy to PCN.   -leukocytosis  -cultures reviewed  -on meropenem 1 gm IV q8h # 3  -tolerating abx well so far; no side effects noted  -monitor closely in mike of PCN allergy history  -repeat BC x 2 noted  -change abx ot ertapenem 1 gm IV qd for 4 weeks  -midline  -home IV abx setup in progress; case reviewed with case management; orders reviewed and called in to pharmacist with infusion company; awaiting insurance approval  -weekly labs  -contact isolation  -continue abx coverage   -consider urology evaluation  -monitor temps  -f/u CBC  -supportive care  2. Other issues:   -care per medicine    d/w medical team

## 2023-03-12 LAB
CULTURE RESULTS: SIGNIFICANT CHANGE UP
SPECIMEN SOURCE: SIGNIFICANT CHANGE UP

## 2023-03-15 DIAGNOSIS — A41.51 SEPSIS DUE TO ESCHERICHIA COLI [E. COLI]: ICD-10-CM

## 2023-03-15 DIAGNOSIS — Z16.12 EXTENDED SPECTRUM BETA LACTAMASE (ESBL) RESISTANCE: ICD-10-CM

## 2023-03-15 DIAGNOSIS — I25.10 ATHEROSCLEROTIC HEART DISEASE OF NATIVE CORONARY ARTERY WITHOUT ANGINA PECTORIS: ICD-10-CM

## 2023-03-15 DIAGNOSIS — Z79.82 LONG TERM (CURRENT) USE OF ASPIRIN: ICD-10-CM

## 2023-03-15 DIAGNOSIS — N40.0 BENIGN PROSTATIC HYPERPLASIA WITHOUT LOWER URINARY TRACT SYMPTOMS: ICD-10-CM

## 2023-03-15 DIAGNOSIS — E11.9 TYPE 2 DIABETES MELLITUS WITHOUT COMPLICATIONS: ICD-10-CM

## 2023-03-15 DIAGNOSIS — Z79.84 LONG TERM (CURRENT) USE OF ORAL HYPOGLYCEMIC DRUGS: ICD-10-CM

## 2023-03-15 DIAGNOSIS — N39.0 URINARY TRACT INFECTION, SITE NOT SPECIFIED: ICD-10-CM

## 2023-03-15 DIAGNOSIS — R65.21 SEVERE SEPSIS WITH SEPTIC SHOCK: ICD-10-CM

## 2023-03-15 DIAGNOSIS — Z95.5 PRESENCE OF CORONARY ANGIOPLASTY IMPLANT AND GRAFT: ICD-10-CM

## 2023-03-15 DIAGNOSIS — Z79.02 LONG TERM (CURRENT) USE OF ANTITHROMBOTICS/ANTIPLATELETS: ICD-10-CM

## 2023-03-15 DIAGNOSIS — I95.9 HYPOTENSION, UNSPECIFIED: ICD-10-CM

## 2023-03-15 DIAGNOSIS — N41.0 ACUTE PROSTATITIS: ICD-10-CM

## 2023-03-15 DIAGNOSIS — E87.6 HYPOKALEMIA: ICD-10-CM

## 2025-01-17 NOTE — DISCHARGE NOTE ADULT - NS TRANSFER PATIENT BELONGINGS
Cell Phone/PDA (specify)/Clothing [facility]  Neurology  [address] [place] [address]  Phone: [phone]  Fax: [phone]  Follow Up Time: 4-6 Days     [facility]  Neurology  [address] [place] [address]  Phone: [phone]  Fax: [phone]  Follow Up Time: 4-6 Days